# Patient Record
Sex: MALE | Race: WHITE | NOT HISPANIC OR LATINO | Employment: FULL TIME | URBAN - METROPOLITAN AREA
[De-identification: names, ages, dates, MRNs, and addresses within clinical notes are randomized per-mention and may not be internally consistent; named-entity substitution may affect disease eponyms.]

---

## 2017-08-02 ENCOUNTER — OFFICE VISIT (OUTPATIENT)
Dept: PSYCHOLOGY | Facility: CLINIC | Age: 37
End: 2017-08-02
Payer: COMMERCIAL

## 2017-08-02 DIAGNOSIS — F43.22 ADJUSTMENT DISORDER WITH ANXIOUS MOOD: Primary | ICD-10-CM

## 2017-08-02 PROCEDURE — 90791 PSYCH DIAGNOSTIC EVALUATION: CPT | Performed by: PSYCHOLOGIST

## 2017-08-02 NOTE — MR AVS SNAPSHOT
"                  MRN:[id number]                      After Visit Summary   2017    Dat Yoder    MRN: [id number]           Visit Information        Provider Department      2017 10:30 AM Alison Cheng JCARLOSCELINA [facility] Generic      Your next 10 appointments already scheduled     Aug 09, 2017  9:30 AM CDT   Return Visit with Alison GARBER RyanalcidesCELINA   [facility] ([address] [address] [phone]            Aug 16, 2017  9:30 AM CDT   Return Visit with Alison Cheng CELINA   [facility] ([address] [address] [phone]              MyChart Information     [employer] lets you send messages to your doctor, view your test results, renew your prescriptions, schedule appointments and more. To sign up, go to [email or web address] . Click on \"Log in\" on the left side of the screen, which will take you to the Welcome page. Then click on \"Sign up Now\" on the right side of the page.     You will be asked to enter the access code listed below, as well as some personal information. Please follow the directions to create your username and password.     Your access code is: [email or web address]  Expires: 10/31/2017 12:19 PM     Your access code will  in 90 days. If you need help or a new code, please call your [place] clinic or [phone].        Care EveryWhere ID     This is your Care EveryWhere ID. This could be used by other organizations to access your [place] medical records  [id number]        Equal Access to Services     SHIVA KUMAR : Hadii jo ann Romero, waaxda luqadaha, qaybta kaalmada pietro, curtis fregoso. So [facility] [phone].    ATENCIÓN: Si habla español, tiene a ramirez disposición servicios gratuitos de asistencia lingüística. Llame al [phone].    We comply with applicable " federal civil rights laws and [place] laws. We do not discriminate on the basis of race, color, national origin, age, disability sex, sexual orientation or gender identity.
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
## 2017-08-09 ENCOUNTER — OFFICE VISIT (OUTPATIENT)
Dept: PSYCHOLOGY | Facility: CLINIC | Age: 37
End: 2017-08-09
Payer: COMMERCIAL

## 2017-08-09 DIAGNOSIS — F43.22 ADJUSTMENT DISORDER WITH ANXIETY: Primary | ICD-10-CM

## 2017-08-09 DIAGNOSIS — Z63.0 PARTNER RELATIONSHIP PROBLEM: ICD-10-CM

## 2017-08-09 PROCEDURE — 90847 FAMILY PSYTX W/PT 50 MIN: CPT | Performed by: PSYCHOLOGIST

## 2017-08-09 SDOH — SOCIAL STABILITY - SOCIAL INSECURITY: PROBLEMS IN RELATIONSHIP WITH SPOUSE OR PARTNER: Z63.0

## 2017-08-09 NOTE — PROGRESS NOTES
Progress Note    Client Name: Dat Yoder  Date: 8/9/2017       Service Type: Couple      Session Start Time: 9:30  Session End Time: 10:25      Session Length: 55 min     Session #: 2     Attendees: Client and Spouse / Significant Other    Treatment Plan Last Reviewed: started  PHQ-9 / DANIELLE-7: last session     DATA      Progress Since Last Session (Related to Symptoms / Goals / Homework):   Symptoms: Improved    Homework: Partially completed      Episode of Care Goals: Minimal progress - ACTION (Actively working towards change); Intervened by reinforcing change plan / affirming steps taken     Current / Ongoing Stressors and Concerns:   Marital discord   Work stress     Treatment Objective(s) Addressed in This Session:   identify 2 strategies to more effectively address stressors  identify at least 2 techniques for intervening on the escalation  learn & utilize at least 1 patient and assertive communication skills weekly     Intervention:   CBT: ask not interpret  Interpersonal Therapy: listening and repairs. taking turns        ASSESSMENT: Current Emotional / Mental Status (status of significant symptoms):   Risk status (Self / Other harm or suicidal ideation)  Client denies a history of suicidal ideation, suicide attempts, self-injurious behavior, homicidal ideation, homicidal behavior and and other safety concerns   Client denies current fears or concerns for personal safety.   Client denies current or recent suicidal ideation or behaviors.   Client denies current or recent homicidal ideation or behaviors.   Client denies current or recent self injurious behavior or ideation.   Client denies other safety concerns.   A safety and risk management plan has not been developed at this time, however client was given the after-hours number / 911 should there be a change in any of these risk factors.     Appearance:   Appropriate    Eye Contact:   Good    Psychomotor  Behavior: Normal    Attitude:   Cooperative  Guarded    Orientation:   All   Speech    Rate / Production: Normal     Volume:  Normal    Mood:    Angry  Normal agitated    Affect:    Appropriate    Thought Content:  Clear    Thought Form:  Coherent  Logical    Insight:    Fair      Medication Review:   No current psychiatric medications prescribed     Medication Compliance:   NA     Changes in Health Issues:   None reported     Chemical Use Review:   Substance Use: Chemical use reviewed, no active concerns identified      Tobacco Use: No current tobacco use.       Collateral Reports Completed:   Not Applicable    PLAN: (Client Tasks / Therapist Tasks / Other)  Take turns listening. Responding and repairs. Look for effort. Build physical connection.        Alison Cheng LP                                                         ________________________________________________________________________    Treatment Plan    Client's Name: Dat Yoder  YOB: 1980    Date: 8/9/2017    DSM-V Diagnoses:  Adjustment Disorders  309.24 (F43.22) With anxiety  Psychosocial & Contextual Factors: marital discord, work stress  WHODAS: 13 at intake    Referral / Collaboration:  Referral to another professional/service is not indicated at this time..    Anticipated number of session or this episode of care: 10      MeasurableTreatment Goal(s) related to diagnosis / functional impairment(s)  Goal 1: Client will manage work stress and marital relationship better    Objective #A (Client Action)    Client will track and record at least weekly pleasant exchanges with wife.  Status: New - Date: 8/9/17     Intervention(s)  Therapist will assign homework building ways to connect  role-play effective communication skills.    Client has reviewed and agreed to the above plan.    Alison Cheng LP  August 9, 2017

## 2017-08-09 NOTE — MR AVS SNAPSHOT
"                  MRN:[id number]                      After Visit Summary   2017    Dat Yoder    MRN: [id number]           Visit Information        Provider Department      2017 9:30 AM Alison Cheng LP [facility] Generic      Your next 10 appointments already scheduled     Aug 16, 2017  9:30 AM CDT   Return Visit with Alison T CELINA Cheng   [facility] ([facility])    [address] [place] [address] [phone]              MyChart Information     [employer] lets you send messages to your doctor, view your test results, renew your prescriptions, schedule appointments and more. To sign up, go to [email or web address] . Click on \"Log in\" on the left side of the screen, which will take you to the Welcome page. Then click on \"Sign up Now\" on the right side of the page.     You will be asked to enter the access code listed below, as well as some personal information. Please follow the directions to create your username and password.     Your access code is: [email or web address]  Expires: 10/31/2017 12:19 PM     Your access code will  in 90 days. If you need help or a new code, please call your [place] clinic or [phone].        Care EveryWhere ID     This is your Care EveryWhere ID. This could be used by other organizations to access your [place] medical records  [id number]        Equal Access to Services     MICHELLE KUMAR AH: Hadii jo ann kimbrougho Soshantaali, waaxda luqadaha, qaybta kaalmada adeegyada, curtis shepard . So [facility] [phone].    ATENCIÓN: Si habla español, tiene a ramirez disposición servicios gratuitos de asistencia lingüística. Llame al [phone].    We comply with applicable federal civil rights laws and [place] laws. We do not discriminate on the basis of race, color, national origin, age, disability sex, sexual orientation or gender identity.            "

## 2018-01-24 ENCOUNTER — OFFICE VISIT (OUTPATIENT)
Dept: FAMILY MEDICINE | Facility: CLINIC | Age: 38
End: 2018-01-24
Payer: COMMERCIAL

## 2018-01-24 VITALS
RESPIRATION RATE: 16 BRPM | HEART RATE: 88 BPM | OXYGEN SATURATION: 96 % | TEMPERATURE: 102.1 F | WEIGHT: 285 LBS | DIASTOLIC BLOOD PRESSURE: 78 MMHG | BODY MASS INDEX: 39.75 KG/M2 | SYSTOLIC BLOOD PRESSURE: 138 MMHG

## 2018-01-24 DIAGNOSIS — J10.1 INFLUENZA A: Primary | ICD-10-CM

## 2018-01-24 LAB
FLUAV+FLUBV AG SPEC QL: NEGATIVE
FLUAV+FLUBV AG SPEC QL: POSITIVE
SPECIMEN SOURCE: ABNORMAL

## 2018-01-24 PROCEDURE — 87804 INFLUENZA ASSAY W/OPTIC: CPT | Performed by: NURSE PRACTITIONER

## 2018-01-24 PROCEDURE — 99213 OFFICE O/P EST LOW 20 MIN: CPT | Performed by: NURSE PRACTITIONER

## 2018-01-24 RX ORDER — OSELTAMIVIR PHOSPHATE 75 MG/1
75 CAPSULE ORAL 2 TIMES DAILY
Qty: 10 CAPSULE | Refills: 0 | Status: SHIPPED | OUTPATIENT
Start: 2018-01-24 | End: 2018-01-29

## 2018-01-24 ASSESSMENT — PAIN SCALES - GENERAL: PAINLEVEL: NO PAIN (0)

## 2018-01-24 NOTE — MR AVS SNAPSHOT
After Visit Summary   1/24/2018    Dat Yoder    MRN: [id number]           Patient Information     Date Of Birth          1980        Visit Information        Provider Department      1/24/2018 11:00 AM Vanessa Santillan APRN [facility]        Today's Diagnoses     Influenza A    -  1      Care Instructions    Positive for influenza A  Start Tamiflu today (antiviral)  Alternate tylenol and ibuprofen  Push fluids, rest  Contagious as long as you have symptoms + fever  Follow up with primary care provider in 3-5 days as needed, sooner if needed      Influenza (Adult)    Influenza is also called the flu. It is a viral illness that affects the air passages of your lungs. It is different from the common cold. The flu can easily be passed from one to person to another. It may be spread through the air by coughing and sneezing. Or it can be spread by touching the sick person and then touching your own eyes, nose, or mouth.  The flu starts 1 to 3 days after you are exposed to the flu virus. It may last for 1 to 2 weeks but many people feel tired or fatigued for many weeks afterward. You usually don t need to take antibiotics unless you have a complication. This might be an ear or sinus infection or pneumonia.  Symptoms of the flu may be mild or severe. They can include extreme tiredness (wanting to stay in bed all day), chills, fevers, muscle aches, soreness with eye movement, headache, and a dry, hacking cough.  Home care  Follow these guidelines when caring for yourself at home:    Avoid being around cigarette smoke, whether yours or other people s.    Acetaminophen or ibuprofen will help ease your fever, muscle aches, and headache. Don t give aspirin to anyone younger than 18 who has the flu. Aspirin can harm the liver.    Nausea and loss of appetite are common with the flu. Eat light meals. Drink 6 to 8 glasses of liquids every day. Good choices are water, sport drinks,  soft drinks without caffeine, juices, tea, and soup. Extra fluids will also help loosen secretions in your nose and lungs.    Over-the-counter cold medicines will not make the flu go away faster. But the medicines may help with coughing, sore throat, and congestion in your nose and sinuses. Don t use a decongestant if you have high blood pressure.    Stay home until your fever has been gone for at least 24 hours without using medicine to reduce fever.  Follow-up care  Follow up with your healthcare provider, or as advised, if you are not getting better over the next week.  If you are age 65 or older, talk with your provider about getting a pneumococcal vaccine every 5 years. You should also get this vaccine if you have chronic asthma or COPD. All adults should get a flu vaccine every fall. Ask your provider about this.  When to seek medical advice  Call your healthcare provider right away if any of these occur:    Cough with lots of colored mucus (sputum) or blood in your mucus    Chest pain, shortness of breath, wheezing, or trouble breathing    Severe headache, or face, neck, or ear pain    New rash with fever    Fever of 100.4 F (38 C) or higher, or as directed by your healthcare provider    Confusion, behavior change, or seizure    Severe weakness or dizziness    You get a new fever or cough after getting better for a few days  Date Last Reviewed: 1/1/2017 2000-2017 [employer]. [address] [place] [address]. All rights reserved. This information is not intended as a substitute for professional medical care. Always follow your healthcare professional's instructions.                Follow-ups after your visit        Your next 10 appointments already scheduled     Jan 24, 2018 11:00 AM CST   Office Visit with AMANDA Thomas [facility] ([facility])    [address] [facility] [id number] [phone]           Bring  "a current list of meds and any records pertaining to this visit. For Physicals, please bring immunization records and any forms needing to be filled out. Please arrive 10 minutes early to complete paperwork.              Who to contact     If you have questions or need follow up information about today's clinic visit or your schedule please contact [facility] CASIMIRO HUTTON directly at [phone].  Normal or non-critical lab and imaging results will be communicated to you by MyChart, letter or phone within 4 business days after the clinic has received the results. If you do not hear from us within 7 days, please contact the clinic through [employer]hart or phone. If you have a critical or abnormal lab result, we will notify you by phone as soon as possible.  Submit refill requests through [employer] or call your pharmacy and they will forward the refill request to us. Please allow 3 business days for your refill to be completed.          Additional Information About Your Visit        [employer] ([facility] [employer] Information     [employer] lets you send messages to your doctor, view your test results, renew your prescriptions, schedule appointments and more. To sign up, go to [email or web address] . Click on \"Log in\" on the left side of the screen, which will take you to the Welcome page. Then click on \"Sign up Now\" on the right side of the page.     You will be asked to enter the access code listed below, as well as some personal information. Please follow the directions to create your username and password.     Your access code is: [email or web address]  Expires: 2018 10:55 AM     Your access code will  in 90 days. If you need help or a new code, please call your [place] clinic or [phone].        Care EveryWhere ID     This is your Care EveryWhere ID. This could be used by other organizations to access your [place] medical records  [id number]        Your Vitals Were     Pulse Temperature Respirations Pulse Oximetry BMI (Body " Mass Index)       88 102.1  F (38.9  C) (Oral) 16 96% 39.75 kg/m2        Blood Pressure from Last 3 Encounters:   01/24/18 138/78   04/26/16 134/82   11/18/14 108/70    Weight from Last 3 Encounters:   01/24/18 285 lb (129.3 kg)   04/26/16 289 lb (131.1 kg)   11/18/14 281 lb (127.5 kg)              We Performed the Following     Influenza A/B antigen          Today's Medication Changes          These changes are accurate as of 1/24/18 10:55 AM.  If you have any questions, ask your nurse or doctor.               Start taking these medicines.        Dose/Directions    oseltamivir 75 MG capsule   Commonly known as:  TAMIFLU   Used for:  Influenza A   Started by:  Vanessa Santillan APRN CNP        Dose:  75 mg   Take 1 capsule (75 mg) by mouth 2 times daily for 5 days   Quantity:  10 capsule   Refills:  0            Where to get your medicines      Some of these will need a paper prescription and others can be bought over the counter.  Ask your nurse if you have questions.     Bring a paper prescription for each of these medications     oseltamivir 75 MG capsule                Primary Care Provider Office Phone # Fax #    Orestes Kane Barrera [phone] [address] [place], [place] [id number]        Equal Access to Services     MICHELLE KUMAR AH: Hadii jo ann kimbrougho Soroge, waaxda luqadaha, qaybta kaalmada adeegyada, curtis fregoso. So [facility] [phone].    ATENCIÓN: Si habla español, tiene a ramirez disposición servicios gratuitos de asistencia lingüística. Ll[facility] [phone].    We comply with applicable federal civil rights laws and [place] laws. We do not discriminate on the basis of race, color, national origin, age, disability, sex, sexual orientation, or gender identity.            Thank you!     Thank you for choosing [facility]  for your care. Our goal is always to provide you with excellent care. Hearing back from our patients is one way  we can continue to improve our services. Please take a few minutes to complete the written survey that you may receive in the mail after your visit with us. Thank you!             Your Updated Medication List - Protect others around you: Learn how to safely use, store and throw away your medicines at www.disposemymeds.org.          This list is accurate as of 1/24/18 10:55 AM.  Always use your most recent med list.                   Brand Name Dispense Instructions for use Diagnosis    NO ACTIVE MEDICATIONS      .        oseltamivir 75 MG capsule    TAMIFLU    10 capsule    Take 1 capsule (75 mg) by mouth 2 times daily for 5 days    Influenza A

## 2018-01-24 NOTE — PATIENT INSTRUCTIONS
Positive for influenza A  Start Tamiflu today (antiviral)  Alternate tylenol and ibuprofen  Push fluids, rest  Contagious as long as you have symptoms + fever  Follow up with primary care provider in 3-5 days as needed, sooner if needed      Influenza (Adult)    Influenza is also called the flu. It is a viral illness that affects the air passages of your lungs. It is different from the common cold. The flu can easily be passed from one to person to another. It may be spread through the air by coughing and sneezing. Or it can be spread by touching the sick person and then touching your own eyes, nose, or mouth.  The flu starts 1 to 3 days after you are exposed to the flu virus. It may last for 1 to 2 weeks but many people feel tired or fatigued for many weeks afterward. You usually don t need to take antibiotics unless you have a complication. This might be an ear or sinus infection or pneumonia.  Symptoms of the flu may be mild or severe. They can include extreme tiredness (wanting to stay in bed all day), chills, fevers, muscle aches, soreness with eye movement, headache, and a dry, hacking cough.  Home care  Follow these guidelines when caring for yourself at home:    Avoid being around cigarette smoke, whether yours or other people s.    Acetaminophen or ibuprofen will help ease your fever, muscle aches, and headache. Don t give aspirin to anyone younger than 18 who has the flu. Aspirin can harm the liver.    Nausea and loss of appetite are common with the flu. Eat light meals. Drink 6 to 8 glasses of liquids every day. Good choices are water, sport drinks, soft drinks without caffeine, juices, tea, and soup. Extra fluids will also help loosen secretions in your nose and lungs.    Over-the-counter cold medicines will not make the flu go away faster. But the medicines may help with coughing, sore throat, and congestion in your nose and sinuses. Don t use a decongestant if you have high blood pressure.    Stay home  until your fever has been gone for at least 24 hours without using medicine to reduce fever.  Follow-up care  Follow up with your healthcare provider, or as advised, if you are not getting better over the next week.  If you are age 65 or older, talk with your provider about getting a pneumococcal vaccine every 5 years. You should also get this vaccine if you have chronic asthma or COPD. All adults should get a flu vaccine every fall. Ask your provider about this.  When to seek medical advice  Call your healthcare provider right away if any of these occur:    Cough with lots of colored mucus (sputum) or blood in your mucus    Chest pain, shortness of breath, wheezing, or trouble breathing    Severe headache, or face, neck, or ear pain    New rash with fever    Fever of 100.4 F (38 C) or higher, or as directed by your healthcare provider    Confusion, behavior change, or seizure    Severe weakness or dizziness    You get a new fever or cough after getting better for a few days  Date Last Reviewed: 1/1/2017 2000-2017 [employer]. [address] [place] [address], [place] [address]. All rights reserved. This information is not intended as a substitute for professional medical care. Always follow your healthcare professional's instructions.

## 2018-01-24 NOTE — PROGRESS NOTES
"  SUBJECTIVE:   Dat Yoder is a 37 year old male who presents to clinic today for the following health issues:    Acute Illness   Acute illness concerns: Cough  Onset: 4 days     Fever: YES    Chills/Sweats: YES    Headache (location?): YES    Sinus Pressure:YES    Conjunctivitis:  no    Ear Pain: YES: both    Rhinorrhea: YES    Congestion: No    Sore Throat: no      Cough: YES-productive of green/yellow sputum    Wheeze: YES    Decreased Appetite: YES    Nausea: no     Vomiting: no     Diarrhea:  YES    Dysuria/Freq.: no     Fatigue/Achiness: YES    Sick/Strep Exposure: YES     Therapies Tried and outcome: Vitamin C, tea, water, vitamins/herbs    Chest felt heavy 3 days ago. Cough started next day. Now cough is \"on fire.\" Shivering and fever started today. Cough is both dry and productive. No chest pain. Feels a little short of breath with cough. No difficulty breathing. States \"but I worked out just fine.\" Took sudafed. No infleunza vaccine. Family sick with similar symptoms last week. Took tylenol and echinacea, vit C, and pre-workout and a few other workout supplements at 4:45. Did elliptical x40 min. After workout this morning cold symptoms worse.      Problem list and histories reviewed & adjusted, as indicated.  Additional history: as documented    Patient Active Problem List   Diagnosis     Obesity     Nicotine dependence in remission     CARDIOVASCULAR SCREENING; LDL GOAL LESS THAN 160     Past Surgical History:   Procedure Laterality Date     SURGICAL HISTORY OF -   2003    correction of priapism x 2       Social History   Substance Use Topics     Smoking status: Former Smoker     Quit date: 1/1/2012     Smokeless tobacco: Never Used      Comment: Social      Alcohol use Yes      Comment: Socially - Weekends      Family History   Problem Relation Age of Onset     Breast Cancer Mother 50     Lipids Father      Obesity Mother      Obesity Father      Thyroid Disease Mother      GASTROINTESTINAL DISEASE " Sister      Respiratory Maternal Grandmother      CANCER Maternal Grandfather      Brain Cancer     CANCER Paternal Grandfather      Brain Cancer         Current Outpatient Prescriptions   Medication Sig Dispense Refill     oseltamivir (TAMIFLU) 75 MG capsule Take 1 capsule (75 mg) by mouth 2 times daily for 5 days 10 capsule 0     NO ACTIVE MEDICATIONS .       No Known Allergies    Reviewed and updated as needed this visit by clinical staff  Tobacco  Allergies  Meds  Problems       Reviewed and updated as needed this visit by Provider  Allergies  Meds  Problems         ROS:  Constitutional, HEENT, cardiovascular, pulmonary, gi and gu systems are negative, except as otherwise noted.    OBJECTIVE:     /78 (BP Location: Left arm, Patient Position: Chair, Cuff Size: Adult Large)  Pulse 88  Temp 102.1  F (38.9  C) (Oral)  Resp 16  Wt 285 lb (129.3 kg)  SpO2 96%  BMI 39.75 kg/m2  Body mass index is 39.75 kg/(m^2).  GENERAL: alert, no distress and appears tired  HENT: ear canals and TM's normal, nose and mouth without ulcers or lesions  NECK: no adenopathy, no asymmetry, masses, or scars and thyroid normal to palpation  RESP: lungs clear to auscultation - no rales, rhonchi or wheezes  CV: regular rate and rhythm, normal S1 S2, no S3 or S4, no murmur, click or rub, no peripheral edema and peripheral pulses strong  MS: no gross musculoskeletal defects noted, no edema  PSYCH: mentation appears normal, affect normal/bright    Diagnostic Test Results:  Results for orders placed or performed in visit on 01/24/18 (from the past 24 hour(s))   Influenza A/B antigen   Result Value Ref Range    Influenza A/B Agn Specimen Nasal     Influenza A Positive (A) NEG^Negative    Influenza B Negative NEG^Negative       ASSESSMENT/PLAN:         ICD-10-CM    1. Influenza A J10.1 Influenza A/B antigen     oseltamivir (TAMIFLU) 75 MG capsule     Start Tamiflu today (antiviral)  Alternate tylenol and ibuprofen  Push fluids,  rest  Follow up with primary care provider in 3-5 days as needed, sooner if needed    See Patient Instructions    AMANDA Cuellar [facility]

## 2019-01-29 ENCOUNTER — FCC EXTENDED DOCUMENTATION (OUTPATIENT)
Dept: PSYCHOLOGY | Facility: CLINIC | Age: 39
End: 2019-01-29

## 2019-01-29 DIAGNOSIS — F43.22 ADJUSTMENT DISORDER WITH ANXIETY: Primary | ICD-10-CM

## 2019-01-29 NOTE — PROGRESS NOTES
Discharge Summary  Multiple Sessions    Client Name: Dat Yoder MRN#: [id number] YOB: 1980      Intake / Discharge Date: 8/2/17 - 8/9/17 - 2 couples [place]    DSM5 Diagnoses:   Diagnoses:Adjustment Disorders  309.24 (F43.22) With anxiety  Psychosocial & Contextual Factors: marital discord, work stress  WHODAS: 14 at intake          Presenting Concern:  Relationship problems    Reason for Discharge:  Client did not return      Disposition at Time of Last Encounter:   Comments:   Ok     Risk Management:   Client denies a history of suicidal ideation, suicide attempts, self-injurious behavior, homicidal ideation, homicidal behavior and and other safety concerns  A safety and risk management plan has not been developed at this time, however client was given the after-hours number / 911 should there be a change in any of these risk factors.      Referred To:  Take turns listening. Responding and repairs. Look for effort. Build physical connection. Return to therapy as needed.    Alison Cheng, CELINA   1/29/2019

## 2019-12-09 ENCOUNTER — OFFICE VISIT (OUTPATIENT)
Dept: PEDIATRICS | Facility: CLINIC | Age: 39
End: 2019-12-09
Payer: COMMERCIAL

## 2019-12-09 VITALS
SYSTOLIC BLOOD PRESSURE: 114 MMHG | TEMPERATURE: 98.4 F | HEIGHT: 73 IN | OXYGEN SATURATION: 99 % | HEART RATE: 56 BPM | WEIGHT: 250.3 LBS | DIASTOLIC BLOOD PRESSURE: 62 MMHG | BODY MASS INDEX: 33.17 KG/M2

## 2019-12-09 DIAGNOSIS — Z13.220 SCREENING FOR LIPID DISORDERS: ICD-10-CM

## 2019-12-09 DIAGNOSIS — Z00.00 ROUTINE GENERAL MEDICAL EXAMINATION AT A HEALTH CARE FACILITY: Primary | ICD-10-CM

## 2019-12-09 DIAGNOSIS — E66.811 CLASS 1 OBESITY DUE TO EXCESS CALORIES WITHOUT SERIOUS COMORBIDITY WITH BODY MASS INDEX (BMI) OF 32.0 TO 32.9 IN ADULT: ICD-10-CM

## 2019-12-09 DIAGNOSIS — R63.4 RAPID WEIGHT LOSS: ICD-10-CM

## 2019-12-09 DIAGNOSIS — E66.09 CLASS 1 OBESITY DUE TO EXCESS CALORIES WITHOUT SERIOUS COMORBIDITY WITH BODY MASS INDEX (BMI) OF 32.0 TO 32.9 IN ADULT: ICD-10-CM

## 2019-12-09 LAB
ALBUMIN SERPL-MCNC: 4.6 G/DL (ref 3.4–5)
ALP SERPL-CCNC: 63 U/L (ref 40–150)
ALT SERPL W P-5'-P-CCNC: 56 U/L (ref 0–70)
ANION GAP SERPL CALCULATED.3IONS-SCNC: 3 MMOL/L (ref 3–14)
AST SERPL W P-5'-P-CCNC: 26 U/L (ref 0–45)
BILIRUB SERPL-MCNC: 1.2 MG/DL (ref 0.2–1.3)
BUN SERPL-MCNC: 18 MG/DL (ref 7–30)
CALCIUM SERPL-MCNC: 9.4 MG/DL (ref 8.5–10.1)
CHLORIDE SERPL-SCNC: 105 MMOL/L (ref 94–109)
CHOLEST SERPL-MCNC: 167 MG/DL
CO2 SERPL-SCNC: 30 MMOL/L (ref 20–32)
CREAT SERPL-MCNC: 0.84 MG/DL (ref 0.66–1.25)
ERYTHROCYTE [DISTWIDTH] IN BLOOD BY AUTOMATED COUNT: 12.4 % (ref 10–15)
GFR SERPL CREATININE-BSD FRML MDRD: >90 ML/MIN/{1.73_M2}
GLUCOSE SERPL-MCNC: 107 MG/DL (ref 70–99)
HCT VFR BLD AUTO: 45.9 % (ref 40–53)
HDLC SERPL-MCNC: 108 MG/DL
HGB BLD-MCNC: 16 G/DL (ref 13.3–17.7)
LDLC SERPL CALC-MCNC: 51 MG/DL
MCH RBC QN AUTO: 29.7 PG (ref 26.5–33)
MCHC RBC AUTO-ENTMCNC: 34.9 G/DL (ref 31.5–36.5)
MCV RBC AUTO: 85 FL (ref 78–100)
NONHDLC SERPL-MCNC: 59 MG/DL
PLATELET # BLD AUTO: 188 10E9/L (ref 150–450)
POTASSIUM SERPL-SCNC: 3.7 MMOL/L (ref 3.4–5.3)
PROT SERPL-MCNC: 7.5 G/DL (ref 6.8–8.8)
RBC # BLD AUTO: 5.39 10E12/L (ref 4.4–5.9)
SODIUM SERPL-SCNC: 138 MMOL/L (ref 133–144)
TRIGL SERPL-MCNC: 41 MG/DL
TSH SERPL DL<=0.005 MIU/L-ACNC: 3.01 MU/L (ref 0.4–4)
WBC # BLD AUTO: 6.5 10E9/L (ref 4–11)

## 2019-12-09 PROCEDURE — 85027 COMPLETE CBC AUTOMATED: CPT | Performed by: INTERNAL MEDICINE

## 2019-12-09 PROCEDURE — 80053 COMPREHEN METABOLIC PANEL: CPT | Performed by: INTERNAL MEDICINE

## 2019-12-09 PROCEDURE — 90686 IIV4 VACC NO PRSV 0.5 ML IM: CPT | Performed by: INTERNAL MEDICINE

## 2019-12-09 PROCEDURE — 84443 ASSAY THYROID STIM HORMONE: CPT | Performed by: INTERNAL MEDICINE

## 2019-12-09 PROCEDURE — 36415 COLL VENOUS BLD VENIPUNCTURE: CPT | Performed by: INTERNAL MEDICINE

## 2019-12-09 PROCEDURE — 90471 IMMUNIZATION ADMIN: CPT | Performed by: INTERNAL MEDICINE

## 2019-12-09 PROCEDURE — 80061 LIPID PANEL: CPT | Performed by: INTERNAL MEDICINE

## 2019-12-09 PROCEDURE — 99385 PREV VISIT NEW AGE 18-39: CPT | Mod: 25 | Performed by: INTERNAL MEDICINE

## 2019-12-09 ASSESSMENT — MIFFLIN-ST. JEOR: SCORE: 2112.84

## 2019-12-09 NOTE — LETTER
December 9, 2019      Dat Yoder  [address] [facility] [address]        Dear ,    We are writing to inform you of your test results.    Kidney function, liver function and electrolytes are normal.  Your lab shows normal white count and hemoglobin.  Your cholesterol profile is within normal range including the good cholesterol, HDL and the bad cholesterol LDL.  Thyroid test is normal.  Fasting blood sugar is borderline elevated 107.  A value below 100 is normal.  This is not of concern at this time.    Resulted Orders   CBC with platelets   Result Value Ref Range    WBC 6.5 4.0 - 11.0 10e9/L    RBC Count 5.39 4.4 - 5.9 10e12/L    Hemoglobin 16.0 13.3 - 17.7 g/dL    Hematocrit 45.9 40.0 - 53.0 %    MCV 85 78 - 100 fl    MCH 29.7 26.5 - 33.0 pg    MCHC 34.9 31.5 - 36.5 g/dL    RDW 12.4 10.0 - 15.0 %    Platelet Count 188 150 - 450 10e9/L   Comprehensive metabolic panel   Result Value Ref Range    Sodium 138 133 - 144 mmol/L    Potassium 3.7 3.4 - 5.3 mmol/L    Chloride 105 94 - 109 mmol/L    Carbon Dioxide 30 20 - 32 mmol/L    Anion Gap 3 3 - 14 mmol/L    Glucose 107 (H) 70 - 99 mg/dL    Urea Nitrogen 18 7 - 30 mg/dL    Creatinine 0.84 0.66 - 1.25 mg/dL    GFR Estimate >90 >60 mL/min/[1.73_m2]      Comment:      Non  GFR Calc  Starting 12/18/2018, serum creatinine based estimated GFR (eGFR) will be   calculated using the Chronic Kidney Disease Epidemiology Collaboration   (CKD-EPI) equation.      GFR Estimate If Black >90 >60 mL/min/[1.73_m2]      Comment:       GFR Calc  Starting 12/18/2018, serum creatinine based estimated GFR (eGFR) will be   calculated using the Chronic Kidney Disease Epidemiology Collaboration   (CKD-EPI) equation.      Calcium 9.4 8.5 - 10.1 mg/dL    Bilirubin Total 1.2 0.2 - 1.3 mg/dL    Albumin 4.6 3.4 - 5.0 g/dL    Protein Total 7.5 6.8 - 8.8 g/dL    Alkaline Phosphatase 63 40 - 150 U/L    ALT 56 0 - 70 U/L    AST 26 0 - 45 U/L   Lipid  Profile   Result Value Ref Range    Cholesterol 167 <200 mg/dL    Triglycerides 41 <150 mg/dL    HDL Cholesterol 108 >39 mg/dL    LDL Cholesterol Calculated 51 <100 mg/dL      Comment:      Desirable:       <100 mg/dl    Non HDL Cholesterol 59 <130 mg/dL   TSH   Result Value Ref Range    TSH 3.01 0.40 - 4.00 mU/L       If you have any questions or concerns, please call the clinic at the number listed above.   Recommend you return for recheck in 1 year.      Sincerely,        Nelson Gentile MD

## 2019-12-09 NOTE — PROGRESS NOTES
3  SUBJECTIVE:   CC: Dat Yoder is an 39 year old male who presents for preventive health visit.     39-year-old gentleman comes for a physical examination.  2018 he went on a keto diet.  He has had a rapid weight loss of close to 40 pounds.  He exercises 6 days a week.  Usually runs anywhere from 4 to 6 months.  He denies shortness of breath, chest pain, change in bowel habits, dizziness or lightheadedness.  He does not take any regular medication.  Does not use tobacco and uses alcohol socially.      Patient is fasting for lab work today    Healthy Habits:    Do you get at least three servings of calcium containing foods daily (dairy, green leafy vegetables, etc.)? yes    Amount of exercise or daily activities, outside of work: 6 day(s) per week    Problems taking medications regularly not applicable    Medication side effects: No    Have you had an eye exam in the past two years? no    Do you see a dentist twice per year? yes    Do you have sleep apnea, excessive snoring or daytime drowsiness?yes, snoring and family history of sleep apnea    Today's PHQ-2 Score:   PHQ-2 (  [employer]) 2019   Q1: Little interest or pleasure in doing things 0 0   Q2: Feeling down, depressed or hopeless 0 0   PHQ-2 Score 0 0       Abuse: Current or Past(Physical, Sexual or Emotional)- No  Do you feel safe in your environment? Yes        Social History     Tobacco Use     Smoking status: Former Smoker     Last attempt to quit: 2012     Years since quittin.9     Smokeless tobacco: Never Used     Tobacco comment: Social    Substance Use Topics     Alcohol use: Yes     Alcohol/week: 5.0 standard drinks     Types: 5 Cans of beer per week     Comment: Socially - Weekends      If you drink alcohol do you typically have >3 drinks per day or >7 drinks per week? No                      Last PSA: No results found for: PSA    Reviewed orders with patient. Reviewed health maintenance and updated orders  accordingly - Yes  BP Readings from Last 3 Encounters:   19 114/62   18 138/78   16 134/82    Wt Readings from Last 3 Encounters:   19 113.5 kg (250 lb 4.8 oz)   18 129.3 kg (285 lb)   16 131.1 kg (289 lb)                  Patient Active Problem List   Diagnosis     Class 1 obesity due to excess calories with body mass index (BMI) of 32.0 to 32.9 in adult     CARDIOVASCULAR SCREENING; LDL GOAL LESS THAN 160     Past Surgical History:   Procedure Laterality Date     SURGICAL HISTORY OF -       correction of priapism x 2       Social History     Tobacco Use     Smoking status: Former Smoker     Last attempt to quit: 2012     Years since quittin.9     Smokeless tobacco: Never Used     Tobacco comment: Social    Substance Use Topics     Alcohol use: Yes     Alcohol/week: 5.0 standard drinks     Types: 5 Cans of beer per week     Comment: Socially - Weekends      Family History   Problem Relation Age of Onset     Breast Cancer Mother 50     Obesity Mother      Thyroid Disease Mother      Lipids Father      Obesity Father      Crohn's Disease Sister      Respiratory Maternal Grandmother      Cancer Maternal Grandfather         Brain Cancer     Cancer Paternal Grandfather         Brain Cancer         Current Outpatient Medications   Medication Sig Dispense Refill     Multiple Vitamins-Minerals (MULTIVITAMIN ADULT PO)        No Known Allergies    Reviewed and updated as needed this visit by clinical staff  Tobacco  Allergies  Meds  Med Hx  Surg Hx  Fam Hx  Soc Hx        Reviewed and updated as needed this visit by Provider  Fam Hx        Past Medical History:   Diagnosis Date     Priapism     Resolved     Tobacco use disorder     Social smoker. Started smoking at 19 years and quite in       Past Surgical History:   Procedure Laterality Date     SURGICAL HISTORY OF -       correction of priapism x 2       ROS:  CONSTITUTIONAL: NEGATIVE for fever, chills,  "change in weight  INTEGUMENTARY/SKIN: NEGATIVE for worrisome rashes, moles or lesions  EYES: NEGATIVE for vision changes or irritation  ENT: NEGATIVE for ear, mouth and throat problems  RESP: NEGATIVE for significant cough or SOB  CV: NEGATIVE for chest pain, palpitations or peripheral edema  GI: NEGATIVE for nausea, abdominal pain, heartburn, or change in bowel habits   male: negative for dysuria, hematuria, decreased urinary stream, erectile dysfunction, urethral discharge  MUSCULOSKELETAL: NEGATIVE for significant arthralgias or myalgia  NEURO: NEGATIVE for weakness, dizziness or paresthesias  ENDOCRINE: NEGATIVE for temperature intolerance, skin/hair changes  HEME/ALLERGY/IMMUNE: NEGATIVE for bleeding problems  PSYCHIATRIC: NEGATIVE for changes in mood or affect    OBJECTIVE:   /62 (BP Location: Right arm, Patient Position: Sitting, Cuff Size: Adult Large)   Pulse 56   Temp 98.4  F (36.9  C) (Temporal)   Ht 1.86 m (6' 1.23\")   Wt 113.5 kg (250 lb 4.8 oz)   SpO2 99%   BMI 32.82 kg/m    EXAM:  GENERAL: healthy, alert and no distress  EYES: Eyes grossly normal to inspection, PERRL and conjunctivae and sclerae normal  HENT: ear canals and TM's normal, nose and mouth without ulcers or lesions  NECK: no adenopathy, no asymmetry, masses, or scars and thyroid normal to palpation  RESP: lungs clear to auscultation - no rales, rhonchi or wheezes  CV: regular rate and rhythm, normal S1 S2, no S3 or S4, no murmur, click or rub, no peripheral edema and peripheral pulses strong  ABDOMEN: soft, nontender, no hepatosplenomegaly, no masses and bowel sounds normal   (male): normal male genitalia without lesions or urethral discharge, no hernia  RECTAL: normal sphincter tone, no rectal masses, prostate normal size, smooth, nontender without nodules or masses  MS: no gross musculoskeletal defects noted, no edema  SKIN: no suspicious lesions or rashes  NEURO: Normal strength and tone, mentation intact and speech " "normal  PSYCH: mentation appears normal, affect normal/bright  LYMPH: no cervical, supraclavicular, axillary, or inguinal adenopathy    Diagnostic Test Results:  Labs reviewed in Epic    ASSESSMENT/PLAN:     1.  Annual physical examination completed and is good.  2.  Rapid weight loss.  This is intentional weight loss.  Will check CBC, CMP, TSH.  3.  Class I obesity.  BMI is around 32.  Patient has lost almost 40 pounds in the past year and is regularly exercising.  4.  Screening for lipid disorder.    I will get back to her with the lab results and recommendation.  He is current with his immunization.      COUNSELING:  Reviewed preventive health counseling, as reflected in patient instructions       Regular exercise       Healthy diet/nutrition       Vision screening    Estimated body mass index is 32.82 kg/m  as calculated from the following:    Height as of this encounter: 1.86 m (6' 1.23\").    Weight as of this encounter: 113.5 kg (250 lb 4.8 oz).    Weight management plan: Discussed healthy diet and exercise guidelines     reports that he quit smoking about 7 years ago. He has never used smokeless tobacco.      Counseling Resources:  ATP IV Guidelines  Pooled Cohorts Equation Calculator  FRAX Risk Assessment  ICSI Preventive Guidelines  Dietary Guidelines for Americans, 2010  USDA's MyPlate  ASA Prophylaxis  Lung CA Screening    Nelson Gentile MD  [facility]  "

## 2020-10-22 ENCOUNTER — ALLIED HEALTH/NURSE VISIT (OUTPATIENT)
Dept: PEDIATRICS | Facility: CLINIC | Age: 40
End: 2020-10-22
Payer: COMMERCIAL

## 2020-10-22 DIAGNOSIS — Z23 NEED FOR PROPHYLACTIC VACCINATION AND INOCULATION AGAINST INFLUENZA: Primary | ICD-10-CM

## 2020-10-22 PROCEDURE — 90686 IIV4 VACC NO PRSV 0.5 ML IM: CPT

## 2020-10-22 PROCEDURE — 99207 PR NO CHARGE NURSE ONLY: CPT

## 2022-12-04 ENCOUNTER — HEALTH MAINTENANCE LETTER (OUTPATIENT)
Age: 42
End: 2022-12-04

## 2023-01-02 ENCOUNTER — OFFICE VISIT (OUTPATIENT)
Dept: FAMILY MEDICINE | Facility: CLINIC | Age: 43
End: 2023-01-02
Payer: COMMERCIAL

## 2023-01-02 DIAGNOSIS — F33.41 RECURRENT MAJOR DEPRESSIVE DISORDER, IN PARTIAL REMISSION (H): ICD-10-CM

## 2023-01-02 DIAGNOSIS — E66.01 MORBID OBESITY (H): ICD-10-CM

## 2023-01-02 DIAGNOSIS — Z11.4 SCREENING FOR HIV (HUMAN IMMUNODEFICIENCY VIRUS): ICD-10-CM

## 2023-01-02 DIAGNOSIS — Z87.898 HISTORY OF SNORING: ICD-10-CM

## 2023-01-02 DIAGNOSIS — Z11.59 NEED FOR HEPATITIS C SCREENING TEST: ICD-10-CM

## 2023-01-02 DIAGNOSIS — Z00.00 ROUTINE GENERAL MEDICAL EXAMINATION AT A HEALTH CARE FACILITY: Primary | ICD-10-CM

## 2023-01-02 DIAGNOSIS — R03.0 ELEVATED BP WITHOUT DIAGNOSIS OF HYPERTENSION: ICD-10-CM

## 2023-01-02 PROCEDURE — 99386 PREV VISIT NEW AGE 40-64: CPT | Performed by: FAMILY MEDICINE

## 2023-01-02 PROCEDURE — 99214 OFFICE O/P EST MOD 30 MIN: CPT | Mod: 25 | Performed by: FAMILY MEDICINE

## 2023-01-02 ASSESSMENT — ENCOUNTER SYMPTOMS
HEADACHES: 0
HEMATURIA: 0
FEVER: 0
DIZZINESS: 0
PALPITATIONS: 0
NAUSEA: 0
HEMATOCHEZIA: 0
COUGH: 0
CONSTIPATION: 0
SHORTNESS OF BREATH: 0
CHILLS: 0
JOINT SWELLING: 0
EYE PAIN: 0
ARTHRALGIAS: 0
HEARTBURN: 0
DIARRHEA: 0
NERVOUS/ANXIOUS: 0
MYALGIAS: 0
FREQUENCY: 0
PARESTHESIAS: 0
ABDOMINAL PAIN: 0
WEAKNESS: 0
SORE THROAT: 0
DYSURIA: 0

## 2023-01-02 ASSESSMENT — PAIN SCALES - GENERAL: PAINLEVEL: NO PAIN (0)

## 2023-01-02 NOTE — PROGRESS NOTES
SUBJECTIVE:   CC: Dat Yoder is an 42 year old who presents for preventative health visit.     Patient has been advised of split billing requirements and indicates understanding: Yes     Healthy Habits:     Getting at least 3 servings of Calcium per day:  Yes    Bi-annual eye exam:  NO    Dental care twice a year:  Yes    Sleep apnea or symptoms of sleep apnea:  Excessive snoring and Sleep apnea    Diet:  Regular (no restrictions)    Frequency of exercise:  4-5 days/week    Duration of exercise:  45-60 minutes    Taking medications regularly:  Yes    Medication side effects:  None    PHQ-2 Total Score: 2    Additional concerns today:  Yes  Keto - 290-240 lbs- 2019, stopped 1 yr  Gained weight, lack of proper diet added to lot of international travel made him gain weight.  Joined '[employer]' program for weight loss-committed for 6 months    Therapy once in a month for depression  Family h/o EDWARDO      Today's PHQ-2 Score:   PHQ-2 (  [employer]) 2023   Q1: Little interest or pleasure in doing things 1   Q2: Feeling down, depressed or hopeless 1   PHQ-2 Score 2   PHQ-2 Total Score (12-17 Years)- Positive if 3 or more points; Administer PHQ-A if positive -   Q1: Little interest or pleasure in doing things Several days   Q2: Feeling down, depressed or hopeless Several days   PHQ-2 Score 2       Have you ever done Advance Care Planning? (For example, a Health Directive, POLST, or a discussion with a medical provider or your loved ones about your wishes): No, advance care planning information given to patient to review.  Patient plans to discuss their wishes with loved ones or provider.      Social History     Tobacco Use     Smoking status: Former     Types: Cigarettes     Quit date: 2012     Years since quittin.0     Smokeless tobacco: Never     Tobacco comments:     Social    Substance Use Topics     Alcohol use: Yes     Alcohol/week: 5.0 standard drinks     Types: 5 Cans of beer per week     Comment: Socially  - Weekends      If you drink alcohol do you typically have >3 drinks per day or >7 drinks per week? Yes      Alcohol Use 1/2/2023   Prescreen: >3 drinks/day or >7 drinks/week? No   Prescreen: >3 drinks/day or >7 drinks/week? -   No flowsheet data found.    Last PSA: No results found for: PSA    Reviewed orders with patient. Reviewed health maintenance and updated orders accordingly - Yes  Labs reviewed in EPIC    Reviewed and updated as needed this visit by clinical staff                  Reviewed and updated as needed this visit by Provider                     Review of Systems   Constitutional: Negative for chills and fever.   HENT: Negative for congestion, ear pain, hearing loss and sore throat.    Eyes: Negative for pain and visual disturbance.   Respiratory: Negative for cough and shortness of breath.    Cardiovascular: Negative for chest pain, palpitations and peripheral edema.   Gastrointestinal: Negative for abdominal pain, constipation, diarrhea, heartburn, hematochezia and nausea.   Genitourinary: Negative for dysuria, frequency, genital sores, hematuria, impotence, penile discharge and urgency.   Musculoskeletal: Negative for arthralgias, joint swelling and myalgias.   Skin: Negative for rash.   Neurological: Negative for dizziness, weakness, headaches and paresthesias.   Psychiatric/Behavioral: Positive for mood changes. The patient is not nervous/anxious.          OBJECTIVE:   There were no vitals taken for this visit.    Physical Exam  GENERAL: healthy, alert and no distress  NECK: no adenopathy, no asymmetry, masses, or scars and thyroid normal to palpation  RESP: lungs clear to auscultation - no rales, rhonchi or wheezes  CV: regular rate and rhythm, normal S1 S2, no S3 or S4, no murmur, click or rub, no peripheral edema and peripheral pulses strong  ABDOMEN: soft, nontender, no hepatosplenomegaly, no masses and bowel sounds normal  MS: no gross musculoskeletal defects noted, no edema    Diagnostic  Test Results:  Labs reviewed in Epic    ASSESSMENT/PLAN:   (Z00.00) Routine general medical examination at a health care facility  (primary encounter diagnosis)  -High blood pressure with abnormal BMI.  Recommendations as below.  -Up-to-date on vaccines  Plan: CBC with platelets, Comprehensive metabolic         panel (BMP + Alb, Alk Phos, ALT, AST, Total.         Bili, TP), Lipid panel reflex to direct LDL         Fasting          (R03.0) Elevated BP without diagnosis of hypertension  -No diagnosis of hypertension.  Blood pressures has been normal in previous visit from 2019.  -Discussed with the patient and recommended to follow-up in the clinic in 1 week as nurse only visit for blood pressure check.  -He stated that he is usually scared of needles and the thought of getting blood test today made him very anxious.  -Also notified that weight gain also could have contributed to increased blood pressure.  -He will also check home blood pressures.If home blood pressures are more than 140s/90s, will schedule clinic visit for blood pressure control.    (Z87.898) History of snoring  -Family history of EDWARDO.    Plan: Adult Sleep Eval & Management          Referral      (F33.41) Recurrent major depressive disorder, in partial remission (H)  -Stable, in partial remission.  Going to therapy once in a month.  Not on any medications.    (E66.01) Morbid obesity (H)  -Dat joined [employer] program for weight loss.  -He has been previously on a keto diet, lost 50 to 60 pounds,started taking regular diet 1 yr before and has regained his weight due to poor dietary habits.  -Highly motivated to work on weight this time.    (Z11.4) Screening for HIV (human immunodeficiency virus)  -Routine screening.  Plan: HIV Antigen Antibody Combo            (Z11.59) Need for hepatitis C screening test  -Routine screening.  Plan: Hepatitis C Screen Reflex to HCV RNA Quant and         Genotype             Patient has been advised of split  billing requirements and indicates understanding: Yes      COUNSELING:   Reviewed preventive health counseling, as reflected in patient instructions       Regular exercise       Healthy diet/nutrition        He reports that he quit smoking about 11 years ago. He has never used smokeless tobacco.            Brooks Diaz MD  [facility]

## 2023-01-16 ENCOUNTER — LAB (OUTPATIENT)
Dept: LAB | Facility: CLINIC | Age: 43
End: 2023-01-16
Payer: COMMERCIAL

## 2023-01-16 VITALS
HEIGHT: 71 IN | OXYGEN SATURATION: 99 % | WEIGHT: 289 LBS | BODY MASS INDEX: 40.46 KG/M2 | DIASTOLIC BLOOD PRESSURE: 88 MMHG | TEMPERATURE: 97.2 F | RESPIRATION RATE: 18 BRPM | SYSTOLIC BLOOD PRESSURE: 124 MMHG | HEART RATE: 93 BPM

## 2023-01-16 DIAGNOSIS — Z11.4 SCREENING FOR HIV (HUMAN IMMUNODEFICIENCY VIRUS): ICD-10-CM

## 2023-01-16 DIAGNOSIS — Z11.59 NEED FOR HEPATITIS C SCREENING TEST: ICD-10-CM

## 2023-01-16 DIAGNOSIS — Z00.00 ROUTINE GENERAL MEDICAL EXAMINATION AT A HEALTH CARE FACILITY: ICD-10-CM

## 2023-01-16 LAB
ALBUMIN SERPL BCG-MCNC: 4.6 G/DL (ref 3.5–5.2)
ALP SERPL-CCNC: 47 U/L (ref 40–129)
ALT SERPL W P-5'-P-CCNC: 28 U/L (ref 10–50)
ANION GAP SERPL CALCULATED.3IONS-SCNC: 15 MMOL/L (ref 7–15)
AST SERPL W P-5'-P-CCNC: 30 U/L (ref 10–50)
BILIRUB SERPL-MCNC: 0.8 MG/DL
BUN SERPL-MCNC: 11.8 MG/DL (ref 6–20)
CALCIUM SERPL-MCNC: 9.6 MG/DL (ref 8.6–10)
CHLORIDE SERPL-SCNC: 104 MMOL/L (ref 98–107)
CHOLEST SERPL-MCNC: 146 MG/DL
CREAT SERPL-MCNC: 1.06 MG/DL (ref 0.67–1.17)
DEPRECATED HCO3 PLAS-SCNC: 22 MMOL/L (ref 22–29)
ERYTHROCYTE [DISTWIDTH] IN BLOOD BY AUTOMATED COUNT: 12.8 % (ref 10–15)
GFR SERPL CREATININE-BSD FRML MDRD: 90 ML/MIN/1.73M2
GLUCOSE SERPL-MCNC: 108 MG/DL (ref 70–99)
HCT VFR BLD AUTO: 40.2 % (ref 40–53)
HCV AB SERPL QL IA: NONREACTIVE
HDLC SERPL-MCNC: 78 MG/DL
HGB BLD-MCNC: 14.4 G/DL (ref 13.3–17.7)
HIV 1+2 AB+HIV1 P24 AG SERPL QL IA: NONREACTIVE
LDLC SERPL CALC-MCNC: 58 MG/DL
MCH RBC QN AUTO: 30.2 PG (ref 26.5–33)
MCHC RBC AUTO-ENTMCNC: 35.8 G/DL (ref 31.5–36.5)
MCV RBC AUTO: 84 FL (ref 78–100)
NONHDLC SERPL-MCNC: 68 MG/DL
PLATELET # BLD AUTO: 179 10E3/UL (ref 150–450)
POTASSIUM SERPL-SCNC: 3.9 MMOL/L (ref 3.4–5.3)
PROT SERPL-MCNC: 6.8 G/DL (ref 6.4–8.3)
RBC # BLD AUTO: 4.77 10E6/UL (ref 4.4–5.9)
SODIUM SERPL-SCNC: 141 MMOL/L (ref 136–145)
TRIGL SERPL-MCNC: 49 MG/DL
TSH SERPL DL<=0.005 MIU/L-ACNC: 2.68 UIU/ML (ref 0.3–4.2)
WBC # BLD AUTO: 4.1 10E3/UL (ref 4–11)

## 2023-01-16 PROCEDURE — 36415 COLL VENOUS BLD VENIPUNCTURE: CPT

## 2023-01-16 PROCEDURE — 80053 COMPREHEN METABOLIC PANEL: CPT

## 2023-01-16 PROCEDURE — 86803 HEPATITIS C AB TEST: CPT

## 2023-01-16 PROCEDURE — 87389 HIV-1 AG W/HIV-1&-2 AB AG IA: CPT

## 2023-01-16 PROCEDURE — 85027 COMPLETE CBC AUTOMATED: CPT

## 2023-01-16 PROCEDURE — 84443 ASSAY THYROID STIM HORMONE: CPT

## 2023-01-16 PROCEDURE — 80061 LIPID PANEL: CPT

## 2023-12-04 ENCOUNTER — PATIENT OUTREACH (OUTPATIENT)
Dept: CARE COORDINATION | Facility: CLINIC | Age: 43
End: 2023-12-04
Payer: COMMERCIAL

## 2023-12-18 ENCOUNTER — PATIENT OUTREACH (OUTPATIENT)
Dept: CARE COORDINATION | Facility: CLINIC | Age: 43
End: 2023-12-18
Payer: COMMERCIAL

## 2024-01-18 ENCOUNTER — OFFICE VISIT (OUTPATIENT)
Dept: FAMILY MEDICINE | Facility: CLINIC | Age: 44
End: 2024-01-18
Payer: COMMERCIAL

## 2024-01-18 VITALS
DIASTOLIC BLOOD PRESSURE: 90 MMHG | HEIGHT: 71 IN | TEMPERATURE: 97.9 F | HEART RATE: 78 BPM | SYSTOLIC BLOOD PRESSURE: 134 MMHG | WEIGHT: 296 LBS | OXYGEN SATURATION: 100 % | RESPIRATION RATE: 16 BRPM | BODY MASS INDEX: 41.44 KG/M2

## 2024-01-18 DIAGNOSIS — Z00.00 ROUTINE GENERAL MEDICAL EXAMINATION AT A HEALTH CARE FACILITY: Primary | ICD-10-CM

## 2024-01-18 DIAGNOSIS — E66.01 MORBID OBESITY (H): ICD-10-CM

## 2024-01-18 DIAGNOSIS — F33.41 RECURRENT MAJOR DEPRESSIVE DISORDER, IN PARTIAL REMISSION (H): ICD-10-CM

## 2024-01-18 DIAGNOSIS — Z87.898 HISTORY OF SNORING: ICD-10-CM

## 2024-01-18 DIAGNOSIS — R03.0 ELEVATED BP WITHOUT DIAGNOSIS OF HYPERTENSION: ICD-10-CM

## 2024-01-18 PROCEDURE — 99396 PREV VISIT EST AGE 40-64: CPT | Performed by: FAMILY MEDICINE

## 2024-01-18 PROCEDURE — 96127 BRIEF EMOTIONAL/BEHAV ASSMT: CPT | Performed by: FAMILY MEDICINE

## 2024-01-18 PROCEDURE — 99213 OFFICE O/P EST LOW 20 MIN: CPT | Mod: 25 | Performed by: FAMILY MEDICINE

## 2024-01-18 ASSESSMENT — ENCOUNTER SYMPTOMS
ARTHRALGIAS: 0
PALPITATIONS: 0
CONSTIPATION: 0
HEMATURIA: 0
HEARTBURN: 0
SHORTNESS OF BREATH: 0
DIARRHEA: 0
FEVER: 0
DIZZINESS: 0
CHILLS: 0
JOINT SWELLING: 0
SORE THROAT: 0
EYE PAIN: 0
HEMATOCHEZIA: 0
COUGH: 0
MYALGIAS: 0
FREQUENCY: 0
HEADACHES: 0
PARESTHESIAS: 0
DYSURIA: 0
NERVOUS/ANXIOUS: 1
ABDOMINAL PAIN: 0
WEAKNESS: 0
NAUSEA: 0

## 2024-01-18 ASSESSMENT — PATIENT HEALTH QUESTIONNAIRE - PHQ9
10. IF YOU CHECKED OFF ANY PROBLEMS, HOW DIFFICULT HAVE THESE PROBLEMS MADE IT FOR YOU TO DO YOUR WORK, TAKE CARE OF THINGS AT HOME, OR GET ALONG WITH OTHER PEOPLE: SOMEWHAT DIFFICULT
SUM OF ALL RESPONSES TO PHQ QUESTIONS 1-9: 7
SUM OF ALL RESPONSES TO PHQ QUESTIONS 1-9: 7

## 2024-01-18 ASSESSMENT — PAIN SCALES - GENERAL: PAINLEVEL: NO PAIN (0)

## 2024-01-18 NOTE — PROGRESS NOTES
Preventive Care Visit  [facility]  Brooks Diaz MD, Family Medicine  2024      SUBJECTIVE:   Dat Yoder is a 43 year old, presenting for the following:  Physical        2024     9:28 AM   Additional Questions   Roomed by Danika   Accompanied by self     Healthy Habits:     Getting at least 3 servings of Calcium per day:  Yes    Bi-annual eye exam:  NO    Dental care twice a year:  Yes    Sleep apnea or symptoms of sleep apnea:  Daytime drowsiness, Excessive snoring and Sleep apnea    Diet:  Regular (no restrictions)    Frequency of exercise:  6-7 days/week    Duration of exercise:  45-60 minutes    Taking medications regularly:  Yes    Medication side effects:  None    Additional concerns today:  Yes    Today's PHQ-9 Score:       2024     9:29 AM   PHQ-9 SCORE   PHQ-9 Total Score MyChart 7 (Mild depression)   PHQ-9 Total Score 7                     Social History     Tobacco Use    Smoking status: Former     Types: Cigarettes     Quit date: 2012     Years since quittin.0    Smokeless tobacco: Never    Tobacco comments:     Social    Substance Use Topics    Alcohol use: Yes     Alcohol/week: 5.0 standard drinks of alcohol     Types: 5 Cans of beer per week     Comment: Socially - Weekends              2024     9:33 AM   Alcohol Use   Prescreen: >3 drinks/day or >7 drinks/week? Yes   AUDIT SCORE  5         2024     9:33 AM   AUDIT - Alcohol Use Disorders Identification Test - Reproduced from the World Health Organization Audit 2001 (Second Edition)   1.  How often do you have a drink containing alcohol? 2 to 3 times a week   2.  How many drinks containing alcohol do you have on a typical day when you are drinking? 1 or 2   3.  How often do you have five or more drinks on one occasion? Monthly   4.  How often during the last year have you found that you were not able to stop drinking once you had started? Never   5.  How often  "during the last year have you failed to do what was normally expected of you because of drinking? Never   6.  How often during the last year have you needed a first drink in the morning to get yourself going after a heavy drinking session? Never   7.  How often during the last year have you had a feeling of guilt or remorse after drinking? Never   8.  How often during the last year have you been unable to remember what happened the night before because of your drinking? Never   9.  Have you or someone else been injured because of your drinking? No   10. Has a relative, friend, doctor or other health care worker been concerned about your drinking or suggested you cut down? No   TOTAL SCORE 5       Last PSA: No results found for: \"PSA\"    Reviewed orders with patient. Reviewed health maintenance and updated orders accordingly - Yes  Lab work is in process    Reviewed and updated as needed this visit by clinical staff   Tobacco  Allergies  Meds              Reviewed and updated as needed this visit by Provider                      OBJECTIVE:   BP (!) 134/90 (BP Location: Left arm, Patient Position: Sitting, Cuff Size: Adult Large)   Pulse 78   Temp 97.9  F (36.6  C) (Oral)   Resp 16   Ht 1.802 m (5' 10.95\")   Wt 134.3 kg (296 lb)   SpO2 100%   BMI 41.35 kg/m     Estimated body mass index is 41.35 kg/m  as calculated from the following:    Height as of this encounter: 1.802 m (5' 10.95\").    Weight as of this encounter: 134.3 kg (296 lb).  Review of Systems   Constitutional:  Negative for chills and fever.   HENT:  Negative for congestion, ear pain, hearing loss and sore throat.    Eyes:  Negative for pain and visual disturbance.   Respiratory:  Negative for cough and shortness of breath.    Cardiovascular:  Negative for chest pain and palpitations.   Gastrointestinal:  Negative for abdominal pain, constipation, diarrhea and nausea.   Genitourinary:  Negative for dysuria, frequency, genital sores, hematuria " and urgency.   Musculoskeletal:  Negative for arthralgias, joint swelling and myalgias.   Skin:  Negative for rash.   Neurological:  Negative for dizziness, weakness and headaches.   Psychiatric/Behavioral:  The patient is nervous/anxious.        Physical Exam  GENERAL: alert and no distress  NECK: no adenopathy, no asymmetry, masses, or scars  RESP: lungs clear to auscultation - no rales, rhonchi or wheezes  CV: regular rate and rhythm, normal S1 S2, no S3 or S4, no murmur, click or rub, no peripheral edema  ABDOMEN: soft, nontender, no hepatosplenomegaly, no masses and bowel sounds normal  MS: no gross musculoskeletal defects noted, no edema    Diagnostic Test Results:  Labs reviewed in Epic    ASSESSMENT/PLAN:   Routine general medical examination at a health care facility  -Elevated blood pressure as addressed below.    - CBC with platelets; Future  - Basic metabolic panel  (Ca, Cl, CO2, Creat, Gluc, K, Na, BUN); Future  - ALT; Future  - Lipid panel reflex to direct LDL Fasting; Future  - Hemoglobin A1c; Future    Elevated BP without diagnosis of hypertension  -Dta reported that his home blood pressures are usually stable, coming to clinic and fear of needles is making blood pressures go up.  -Recommended DASH diet, keep checking blood pressures at home.  If is elevated more than 140/90 recommend to schedule clinic visit to start medications.    Morbid obesity (H)  -Dat has tried [employer] program, keto diet in the past and will lose significant pounds but will regain back again.  -Physically active, working on his diet .  -Discussed medical weight management due to BMI being more than 40,Dat was agreeable.  Referral sent to comprehensive weight management.    - Adult Comprehensive Weight Management  Referral; Future    History of snoring  -Family history of sleep apnea with personal history of snoring.  - Adult Sleep Eval & Management  Referral; Future    Recurrent major depressive disorder,  "in partial remission (H24)  -PHQ-9 score of 7    Patient has been advised of split billing requirements and indicates understanding: Yes      Counseling  Reviewed preventive health counseling, as reflected in patient instructions       Regular exercise       Healthy diet/nutrition      BMI  Estimated body mass index is 41.35 kg/m  as calculated from the following:    Height as of this encounter: 1.802 m (5' 10.95\").    Weight as of this encounter: 134.3 kg (296 lb).   Weight management plan: Patient referred to endocrine and/or weight management specialty      He reports that he quit smoking about 12 years ago. His smoking use included cigarettes. He has never used smokeless tobacco.            Signed Electronically by: Brooks Diaz MD  Answers submitted by the patient for this visit:  Patient Health Questionnaire (Submitted on 1/18/2024)  If you checked off any problems, how difficult have these problems made it for you to do your work, take care of things at home, or get along with other people?: Somewhat difficult  PHQ9 TOTAL SCORE: 7  Annual Preventive Visit (Submitted on 1/18/2024)  Chief Complaint: Annual Exam:  Blood in stool: No  heartburn: No  peripheral edema: No  mood changes: Yes  Skin sensation changes: No    "

## 2024-01-31 ENCOUNTER — LAB (OUTPATIENT)
Dept: LAB | Facility: CLINIC | Age: 44
End: 2024-01-31
Payer: COMMERCIAL

## 2024-01-31 DIAGNOSIS — Z00.00 ROUTINE GENERAL MEDICAL EXAMINATION AT A HEALTH CARE FACILITY: ICD-10-CM

## 2024-01-31 LAB
ALT SERPL W P-5'-P-CCNC: 44 U/L (ref 0–70)
ANION GAP SERPL CALCULATED.3IONS-SCNC: 8 MMOL/L (ref 7–15)
BUN SERPL-MCNC: 14.2 MG/DL (ref 6–20)
CALCIUM SERPL-MCNC: 9.2 MG/DL (ref 8.6–10)
CHLORIDE SERPL-SCNC: 105 MMOL/L (ref 98–107)
CHOLEST SERPL-MCNC: 152 MG/DL
CREAT SERPL-MCNC: 1.06 MG/DL (ref 0.67–1.17)
DEPRECATED HCO3 PLAS-SCNC: 28 MMOL/L (ref 22–29)
EGFRCR SERPLBLD CKD-EPI 2021: 90 ML/MIN/1.73M2
ERYTHROCYTE [DISTWIDTH] IN BLOOD BY AUTOMATED COUNT: 12.4 % (ref 10–15)
FASTING STATUS PATIENT QL REPORTED: YES
GLUCOSE SERPL-MCNC: 106 MG/DL (ref 70–99)
HBA1C MFR BLD: 4.9 % (ref 0–5.6)
HCT VFR BLD AUTO: 42.6 % (ref 40–53)
HDLC SERPL-MCNC: 60 MG/DL
HGB BLD-MCNC: 15 G/DL (ref 13.3–17.7)
LDLC SERPL CALC-MCNC: 81 MG/DL
MCH RBC QN AUTO: 30.2 PG (ref 26.5–33)
MCHC RBC AUTO-ENTMCNC: 35.2 G/DL (ref 31.5–36.5)
MCV RBC AUTO: 86 FL (ref 78–100)
NONHDLC SERPL-MCNC: 92 MG/DL
PLATELET # BLD AUTO: 231 10E3/UL (ref 150–450)
POTASSIUM SERPL-SCNC: 4.6 MMOL/L (ref 3.4–5.3)
RBC # BLD AUTO: 4.96 10E6/UL (ref 4.4–5.9)
SODIUM SERPL-SCNC: 141 MMOL/L (ref 135–145)
TRIGL SERPL-MCNC: 53 MG/DL
WBC # BLD AUTO: 5.1 10E3/UL (ref 4–11)

## 2024-01-31 PROCEDURE — 85027 COMPLETE CBC AUTOMATED: CPT

## 2024-01-31 PROCEDURE — 84460 ALANINE AMINO (ALT) (SGPT): CPT

## 2024-01-31 PROCEDURE — 36415 COLL VENOUS BLD VENIPUNCTURE: CPT

## 2024-01-31 PROCEDURE — 80048 BASIC METABOLIC PNL TOTAL CA: CPT

## 2024-01-31 PROCEDURE — 83036 HEMOGLOBIN GLYCOSYLATED A1C: CPT

## 2024-01-31 PROCEDURE — 80061 LIPID PANEL: CPT

## 2024-02-02 ENCOUNTER — OFFICE VISIT (OUTPATIENT)
Dept: SLEEP MEDICINE | Facility: CLINIC | Age: 44
End: 2024-02-02
Attending: FAMILY MEDICINE
Payer: COMMERCIAL

## 2024-02-02 VITALS
HEART RATE: 77 BPM | OXYGEN SATURATION: 98 % | DIASTOLIC BLOOD PRESSURE: 82 MMHG | HEIGHT: 71 IN | RESPIRATION RATE: 18 BRPM | BODY MASS INDEX: 39.06 KG/M2 | SYSTOLIC BLOOD PRESSURE: 140 MMHG | WEIGHT: 279 LBS

## 2024-02-02 DIAGNOSIS — R29.818 SUSPECTED SLEEP APNEA: Primary | ICD-10-CM

## 2024-02-02 DIAGNOSIS — E66.812 OBESITY, CLASS II, BMI 35-39.9: ICD-10-CM

## 2024-02-02 DIAGNOSIS — Z87.898 HISTORY OF SNORING: ICD-10-CM

## 2024-02-02 DIAGNOSIS — R03.0 ELEVATED BP WITHOUT DIAGNOSIS OF HYPERTENSION: ICD-10-CM

## 2024-02-02 PROBLEM — E66.01 MORBID OBESITY (H): Status: RESOLVED | Noted: 2023-01-02 | Resolved: 2024-02-02

## 2024-02-02 PROCEDURE — 99204 OFFICE O/P NEW MOD 45 MIN: CPT | Performed by: NURSE PRACTITIONER

## 2024-02-02 ASSESSMENT — SLEEP AND FATIGUE QUESTIONNAIRES
HOW LIKELY ARE YOU TO NOD OFF OR FALL ASLEEP IN A CAR, WHILE STOPPED FOR A FEW MINUTES IN TRAFFIC: WOULD NEVER DOZE
HOW LIKELY ARE YOU TO NOD OFF OR FALL ASLEEP WHILE SITTING AND READING: SLIGHT CHANCE OF DOZING
HOW LIKELY ARE YOU TO NOD OFF OR FALL ASLEEP WHILE WATCHING TV: MODERATE CHANCE OF DOZING
HOW LIKELY ARE YOU TO NOD OFF OR FALL ASLEEP WHILE SITTING INACTIVE IN A PUBLIC PLACE: SLIGHT CHANCE OF DOZING
HOW LIKELY ARE YOU TO NOD OFF OR FALL ASLEEP WHILE SITTING QUIETLY AFTER LUNCH WITHOUT ALCOHOL: SLIGHT CHANCE OF DOZING
HOW LIKELY ARE YOU TO NOD OFF OR FALL ASLEEP WHILE SITTING AND TALKING TO SOMEONE: WOULD NEVER DOZE
HOW LIKELY ARE YOU TO NOD OFF OR FALL ASLEEP WHILE LYING DOWN TO REST IN THE AFTERNOON WHEN CIRCUMSTANCES PERMIT: HIGH CHANCE OF DOZING
HOW LIKELY ARE YOU TO NOD OFF OR FALL ASLEEP WHEN YOU ARE A PASSENGER IN A CAR FOR AN HOUR WITHOUT A BREAK: MODERATE CHANCE OF DOZING

## 2024-02-02 NOTE — NURSING NOTE
"Chief Complaint   Patient presents with    Consult For    Sleep Problem       Initial BP (!) 140/82   Pulse 77   Resp 18   Ht 1.802 m (5' 10.95\")   Wt 126.6 kg (279 lb)   SpO2 98%   BMI 38.97 kg/m   Estimated body mass index is 38.97 kg/m  as calculated from the following:    Height as of this encounter: 1.802 m (5' 10.95\").    Weight as of this encounter: 126.6 kg (279 lb).    Medication Reconciliation: complete    Neck circumference:  inches / 41 centimeters.    DME:     Stephanie Osborn MA  [facility]    "

## 2024-02-02 NOTE — PROGRESS NOTES
Outpatient Sleep Medicine Consultation:      Name: Dat Yoder MRN# [id number]   Age: 43 year old YOB: 1980     Date of Consultation: February 2, 2024  Consultation is requested by: Brooks Diaz MD  [address]  [place]  [place] [address] Brooks Zendejas*  Primary care provider: Orestes Barrera       Reason for Sleep Consult:     Dat Yoder is sent by Brooks Zendejas* for a sleep consultation regarding snorng.    Patient s Reason for visit  Dat Yoder main reason for visit: snoring & sleep apnea  Patient states problem(s) started: years ago  Dat Yoder's goals for this visit: discuss options           Assessment and Plan:     Summary Sleep Diagnoses and Recommendations:  Presumed sleep apnea   HST, Watch Pat version    Comorbid Diagnoses:  Elevated blood pressure  Class II obesity  Depression, recurrent major     Summary Counseling:    Sleep Testing Reviewed  Obstructive Sleep Apnea Reviewed  Complications of Untreated Sleep Apnea Reviewed      Patient will follow up with a [employer] message after completing sleep study.  Patient will be contacted and therapy will be initiated if indicated  AMANDA Bains, CNP    Total time spent reviewing medical records, history and physical examination, review of previous testing and interpretation as well as documentation on this date:45 minutes    CC: Brooks Zendejas*          History of Present Illness:     Dat Yoder presents to the sleep medicine clinic with concerns      Weight has been a roller coaster BMI today is 38.97.  Hemoglobin A1c is 4.9  Familial Hx EDWARDO.  Father has obstructive sleep apnea, however he does not treat it.    STOP BANG 7/8    Elevated blood pressure, without the diagnosis of hypertension.  Today's blood pressure is 140/82.  Prior blood pressures have been 143/94 and 134/90.    Notes mild sleep initiation insomnia, send severe sleep maintenance  insomnia.  He is very dissatisfied with the quality of his sleep.  MARISA score= 15/28    Patient states he is able to fall asleep and no time at all.  He has 3+ nocturnal awakenings for snort arousals, external stimulation, anxiety.  At times has difficult to returning to sleep after awakening.  Estimates he obtains about 6 hours so fragmented sleep.  Prefers to sleep supine or laterally.    Patient likes to sleep as often as he can.  This amounts to approximately twice a week for 20-30 minutes.  He does feel better after his nap.  He notes that he does fall asleep inadvertently 3 times per week mostly at night.    Suffers from loud snoring, snort arousals, witnessed episodes of apnea.  Notes he also suffers some nasal congestion upon awakening, depression and anxiety related to sleep.    Dat has a high pretest probability for sleep apnea.  We did discuss consequences of untreated sleep apnea in the context of his mood disorder and blood pressure.  Plans for him include a home sleep test.  He will contact me with a [employer] message after he has completed his study, after the such, I will notify him upon availability of his results.  At that time we will collaborate together and determine the next steps in his plan of care.  He is not opposed to CPAP therapy.    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  Wakes up due to Snorting self awake;External stimuli (bed partner, pets, noise, etc);Anxiety  He has trouble falling back asleep   times a week.   It usually takes   to get back to sleep  Patient is  usually up at 5am  Uses alarm: Yes    Weekends/Non-work Days/All Other Days:  Usually gets into bed at 9pm   Takes patient about no time to fall asleep  Patient is usually up at 5am  Uses alarm: Yes    Sleep Need  Patient gets  ~6 hours sleep on average   Patient thinks he needs about 8 hours sleep    Dat Yoder prefers to sleep in this position(s): Back;Side   Patient states they do the following activities in bed: Read;Use phone, computer, or tablet;Work    Naps  Patient takes a purposeful nap as often as possible ~2 times a week times a week and naps are usually 20-30min in duration  He feels better after a nap: Yes  He dozes off unintentionally 3 times per week at night days per week Falls asleep on couch at the end of thhe day   Patient has had a driving accident or near-miss due to sleepiness/drowsiness: No      SLEEP DISRUPTIONS:    Breathing/Snoring  Patient snores:Yes  Other people complain about his snoring: Yes  Patient has been told he stops breathing in his sleep:Yes  He has issues with the following: Stuffy nose when you wake up.     Movement:  Patient gets pain, discomfort, with an urge to move:  No restless legs symptoms  It happens when he is resting:  No  It happens more at night:  No  Patient has been told he kicks his legs at night:  No     Behaviors in Sleep:  Dat Yoder has experienced the following behaviors while sleeping:    He has experienced sudden muscle weakness during the day: No  Pt denies bruxism, sleep talking, sleep walking, and dream enactment behavior. Pt denies sleep paralysis, hypnagogue and cataplexy.       Is there anything else you would like your sleep provider to know:        CAFFEINE AND OTHER SUBSTANCES:    Patient consumes caffeinated beverages per day:  pre workout  Last caffeine use is usually: AM  List of any prescribed or over the counter stimulants that patient takes: pre workout  List of any prescribed or over the counter sleep medication patient takes:  none  List of previous sleep medications that patient has tried: melatonin  Patient drinks alcohol to help them sleep: No  Patient drinks alcohol near bedtime: Yes    Family History:  Patient has a family member been diagnosed with a sleep disorder: Yes  father Does not treat his EDWARDO        SCALES:    EPWORTH SLEEPINESS SCALE         2/2/2024     2:06 PM    [place] Sleepiness Scale ( JULIO CESAR Modi  [id number]<br>ESS - [place]/English - Final version - 21 Nov 07 - [facility] [employer].)   Sitting and reading Slight chance of dozing   Watching TV Moderate chance of dozing   Sitting, inactive in a public place (e.g. a theatre or a meeting) Slight chance of dozing   As a passenger in a car for an hour without a break Moderate chance of dozing   Lying down to rest in the afternoon when circumstances permit High chance of dozing   Sitting and talking to someone Would never doze   Sitting quietly after a lunch without alcohol Slight chance of dozing   In a car, while stopped for a few minutes in traffic Would never doze   [place] Score (MC) 10   [place] Score (Sleep) 10         INSOMNIA SEVERITY INDEX (MARISA)          2/2/2024     1:58 PM   Insomnia Severity Index (MARISA)   Difficulty falling asleep 1   Difficulty staying asleep 3   Problems waking up too early 2   How SATISFIED/DISSATISFIED are you with your CURRENT sleep pattern? 3   How NOTICEABLE to others do you think your sleep problem is in terms of impairing the quality of your life? 2   How WORRIED/DISTRESSED are you about your current sleep problem? 2   To what extent do you consider your sleep problem to INTERFERE with your daily functioning (e.g. daytime fatigue, mood, ability to function at work/daily chores, concentration, memory, mood, etc.) CURRENTLY? 2   MARISA Total Score 15       Guidelines for Scoring/Interpretation:  Total score categories:  0-7 = No clinically significant insomnia   8-14 = Subthreshold insomnia   15-21 = Clinical insomnia (moderate  "severity)  22-28 = Clinical insomnia (severe)  Used via courtesy of www.myhealth.va.gov with permission from Trevon Hernandez PhD., [facility]      STOP BANG         2/2/2024     2:07 PM   STOP BANG Questionnaire (  2008, the American Society of Anesthesiologists, Inc. [employer].)   1. Snoring - Do you snore loudly (louder than talking or loud enough to be heard through closed doors)? Yes   2. Tired - Do you often feel tired, fatigued, or sleepy during daytime? Yes   3. Observed - Has anyone observed you stop breathing during your sleep? Yes   4. Blood pressure - Do you have or are you being treated for high blood pressure? No   5. BMI - BMI more than 35 kg/m2? Yes   6. Age - Age over 50 yr old? No   7. Neck circumference - Neck circumference greater than 40 cm? No   8. Gender - Gender male? Yes   STOP BANG Score (MC): 4 (High risk of EDWARDO)         GAD7         No data to display                  CAGE-AID         No data to display                CAGE-AID reprinted with permission from the [place] Medical Journal, EMELIA Cagle. and INDIRA Nicole, \"Conjoint screening questionnaires for alcohol and drug abuse\" [place] Medical Journal 94: 135-140, 1995.      PATIENT HEALTH QUESTIONNAIRE-9 (PHQ - 9)        1/18/2024     9:29 AM   PHQ-9 ([employer])   1.  Little interest or pleasure in doing things 1   2.  Feeling down, depressed, or hopeless 1   3.  Trouble falling or staying asleep, or sleeping too much 1   4.  Feeling tired or having little energy 1   5.  Poor appetite or overeating 1   6.  Feeling bad about yourself - or that you are a failure or have let yourself or your family down 1   7.  Trouble concentrating on things, such as reading the newspaper or watching television 1   8.  Moving or speaking so slowly that other people could have noticed. Or the opposite - being so fidgety or restless that you have been moving around a lot more than usual 0   9.  Thoughts that you would be better " off dead, or of hurting yourself in some way 0   PHQ-9 Total Score 7   6.  Feeling bad about yourself 1   7.  Trouble concentrating 1   8.  Moving slowly or restless 0   9.  Suicidal or self-harm thoughts 0   1.  Little interest or pleasure in doing things Several days   2.  Feeling down, depressed, or hopeless Several days   3.  Trouble falling or staying asleep, or sleeping too much Several days   4.  Feeling tired or having little energy Several days   5.  Poor appetite or overeating Several days   6.  Feeling bad about yourself Several days   7.  Trouble concentrating Several days   8.  Moving slowly or restless Not at all   9.  Suicidal or self-harm thoughts Not at all   PHQ-9 via [employer] TOTAL SCORE-----> 7 (Mild depression)   Difficulty at work, home, or with people Somewhat difficult       Developed by Dc Duncan, Ondina Beaulieu, Brennon Clement and colleagues, with an educational bing from [employer] No permission required to reproduce, translate, display or distribute.        Allergies:    No Known Allergies    Medications:    Current Outpatient Medications   Medication Sig Dispense Refill    Multiple Vitamins-Minerals (MULTIVITAMIN ADULT PO)          Problem List:  Patient Active Problem List    Diagnosis Date Noted    Recurrent major depressive disorder, in partial remission (H24) 01/18/2024     Priority: Medium    Morbid obesity (H) 01/02/2023     Priority: Medium    CARDIOVASCULAR SCREENING; LDL GOAL LESS THAN 160 05/09/2010     Priority: Medium    Class 1 obesity due to excess calories with body mass index (BMI) of 32.0 to 32.9 in adult 10/17/2007     Priority: Medium     Problem list name updated by automated process. Provider to review          Past Medical/Surgical History:  Past Medical History:   Diagnosis Date    Priapism 2003    Resolved    Tobacco use disorder     Social smoker. Started smoking at 19 years and quite in 2012     Past Surgical History:   Procedure Laterality Date     SURGICAL HISTORY OF -       correction of priapism x 2       Social History:  Social History     Socioeconomic History    Marital status:      Spouse name: Engaged -     Number of children: 0    Years of education: Not on file    Highest education level: Not on file   Occupational History    Occupation: Photo Rep-  - Marketing     Employer: [employer]   Tobacco Use    Smoking status: Former     Types: Cigarettes     Quit date: 2012     Years since quittin.0    Smokeless tobacco: Never    Tobacco comments:     Social    Substance and Sexual Activity    Alcohol use: Yes     Alcohol/week: 5.0 standard drinks of alcohol     Types: 5 Cans of beer per week     Comment: Socially - Weekends     Drug use: No     Comment: marijuana    Sexual activity: Yes     Partners: Female   Other Topics Concern    Parent/sibling w/ CABG, MI or angioplasty before 65F 55M? No     Service Not Asked    Blood Transfusions Not Asked    Caffeine Concern Not Asked     Comment: Coffee - 1 cup of coffee a day    Occupational Exposure Not Asked    Hobby Hazards Not Asked    Sleep Concern Not Asked    Stress Concern Not Asked    Weight Concern Not Asked    Special Diet Not Asked    Back Care Not Asked    Exercise Not Asked     Comment: Lifting and Cardio - 5 times a week. Concentrating on body lifting    Bike Helmet Not Asked    Seat Belt Not Asked    Self-Exams Not Asked   Social History Narrative    Dairy/d 0-2 servings/d.     Caffeine 1 servings/d    Exercise 7 x week 1 hour per day.     Sunscreen used - Yes    Seatbelts used - Yes    Working smoke/CO detectors in the home - No    Guns stored in the home - Yes    Self Breast Exams - NA    Self Testicular Exam - Yes    Eye Exam up to date - No    Dental Exam up to date - Yes    Pap Smear up to date - NA    Mammogram up to date - NA    PSA up to date - No    Dexa Scan up to date - No    Flex Sig / Colonoscopy up to date - No    Immunizations up to date - Unsure     Abuse: Current or Past(Physical, Sexual or Emotional)- No    Do you feel safe in your environment - Yes    David Mckeon MA    10/17/07             Social Determinants of Health     Financial Resource Strain: Low Risk  (1/18/2024)    Financial Resource Strain     Within the past 12 months, have you or your family members you live with been unable to get utilities (heat, electricity) when it was really needed?: No   Food Insecurity: Low Risk  (1/18/2024)    Food Insecurity     Within the past 12 months, did you worry that your food would run out before you got money to buy more?: No     Within the past 12 months, did the food you bought just not last and you didn t have money to get more?: No   Transportation Needs: Low Risk  (1/18/2024)    Transportation Needs     Within the past 12 months, has lack of transportation kept you from medical appointments, getting your medicines, non-medical meetings or appointments, work, or from getting things that you need?: No   Physical Activity: Not on file   Stress: Not on file   Social Connections: Not on file   Interpersonal Safety: Low Risk  (1/18/2024)    Interpersonal Safety     Do you feel physically and emotionally safe where you currently live?: Yes     Within the past 12 months, have you been hit, slapped, kicked or otherwise physically hurt by someone?: No     Within the past 12 months, have you been humiliated or emotionally abused in other ways by your partner or ex-partner?: No   Housing Stability: Low Risk  (1/18/2024)    Housing Stability     Do you have housing? : Yes     Are you worried about losing your housing?: No       Family History:  Family History   Problem Relation Age of Onset    Breast Cancer Mother 50    Obesity Mother     Thyroid Disease Mother     Lipids Father     Obesity Father     Hypertension Father     Crohn's Disease Sister     Respiratory Maternal Grandmother     Cancer Maternal Grandfather         Brain Cancer    Cancer Paternal Grandfather   "       Brain Cancer       Review of Systems:  A complete review of systems reviewed by me is negative with the exeption of what has been mentioned in the history of present illness.        Physical Examination:  Vitals: BP (!) 140/82   Pulse 77   Resp 18   Ht 1.802 m (5' 10.95\")   Wt 126.6 kg (279 lb)   SpO2 98%   BMI 38.97 kg/m    BMI= Body mass index is 38.97 kg/m .         Physical Exam  Vitals and nursing note reviewed.   Constitutional:       General: He is awake. He is not in acute distress.     Appearance: Normal appearance. He is well-developed and well-groomed. He is obese. He is not ill-appearing, toxic-appearing or diaphoretic.   HENT:      Head: Normocephalic and atraumatic.      Right Ear: External ear normal.      Left Ear: External ear normal.      Nose: Nose normal.      Mouth/Throat:      Mouth: Mucous membranes are moist.      Pharynx: Oropharynx is clear.   Eyes:      Conjunctiva/sclera: Conjunctivae normal.   Cardiovascular:      Rate and Rhythm: Normal rate and regular rhythm.      Pulses: Normal pulses.      Heart sounds: Normal heart sounds.   Pulmonary:      Effort: Pulmonary effort is normal.      Breath sounds: Normal breath sounds.   Musculoskeletal:         General: Normal range of motion.      Cervical back: Normal range of motion and neck supple.   Skin:     General: Skin is warm and dry.      Capillary Refill: Capillary refill takes less than 2 seconds.   Neurological:      General: No focal deficit present.      Mental Status: He is alert and oriented to person, place, and time.   Psychiatric:         Mood and Affect: Mood normal.         Behavior: Behavior normal. Behavior is cooperative.         Thought Content: Thought content normal.         Judgment: Judgment normal.           Mallampati Class: III.  Tonsillar Stage: 1  hidden by pillars.    All Labs Personally Reviewed         Data: All pertinent previous laboratory data reviewed     Recent Labs   Lab Test 01/31/24  0737 " "01/16/23  0819    141   POTASSIUM 4.6 3.9   CHLORIDE 105 104   CO2 28 22   ANIONGAP 8 15   * 108*   BUN 14.2 11.8   CR 1.06 1.06   KIMBERLEY 9.2 9.6       Recent Labs   Lab Test 01/31/24  0737   WBC 5.1   RBC 4.96   HGB 15.0   HCT 42.6   MCV 86   MCH 30.2   MCHC 35.2   RDW 12.4          Recent Labs   Lab Test 01/31/24  0737 01/16/23  0819   PROTTOTAL  --  6.8   ALBUMIN  --  4.6   BILITOTAL  --  0.8   ALKPHOS  --  47   AST  --  30   ALT 44 28       TSH   Date Value   01/16/2023 2.68 uIU/mL   12/09/2019 3.01 mU/L   04/26/2016 2.93 mU/L       Opiates Qualitative Urine (no units)   Date Value   05/06/2003     Not Detected           Reference Range:  Not Detected       No results found for: \"IRONSAT\", \"[id number]\", \"SAM\"    No results found for: \"PH\", \"PHARTERIAL\", \"PO2\", \"KE1RYJHMEEX\", \"SAT\", \"PCO2\", \"HCO3\", \"BASEEXCESS\", \"ARLEEN\", \"BEB\"    @LABRCNTIPR(phv:4,pco2v:4,po2v:4,hco3v:4,shola:4,o2per:4)@    Echocardiology: No results found for this or any previous visit (from the past 4320 hour(s)).    Chest x-ray: No results found for this or any previous visit from the past 365 days.      Chest CT: No results found for this or any previous visit from the past 365 days.      PFT: Most Recent B[facility]ze Pulmonary Function Testing    No results found for: \"20001\"  No results found for: \"20002\"  No results found for: \"20003\"  No results found for: \"20015\"  No results found for: \"20016\"  No results found for: \"20027\"  No results found for: \"20028\"  No results found for: \"20029\"  No results found for: \"20079\"  No results found for: \"20080\"  No results found for: \"20081\"  No results found for: \"20335\"  No results found for: \"20105\"  No results found for: \"20053\"  No results found for: \"20054\"  No results found for: \"20055\"      AMANDA Forrest CNP 2/2/2024   Sleep Medicine    This note was written with the assistance of the Dragon voice-dictation technology software. The final document, although reviewed, may contain " errors. For corrections, please contact the office.

## 2024-02-02 NOTE — NURSING NOTE
Watch Pat Mail Out and Return Visit has been Scheduled. Watch Pat Information sent to Patient Via [employer]. [facility] Osborn MA  [facility]

## 2024-02-02 NOTE — PATIENT INSTRUCTIONS
"          MY TREATMENT INFORMATION FOR SLEEP APNEA-  Dat [facility]oder    DOCTOR : AMANDA Forrest CNP    Am I having a sleep study at a sleep center?  --->Due to normal delays, you will be contacted within 2-4 weeks to schedule    Am I having a home sleep study?  --->Watch the video for the device you are using:    -/drop off device-   https://www.MessageOne.com/watch?v=yGGFBdELGhk    -Disposable device sent out require phone/computer application-   https://www.MessageOne.com/watch?v=BCce_vbiwxE      Frequently asked questions:  1. What is Obstructive Sleep Apnea (EDWARDO)? EDWARDO is the most common type of sleep apnea. Apnea means, \"without breath.\"  Apnea is most often caused by narrowing or collapse of the upper airway as muscles relax during sleep.   Almost everyone has occasional apneas. Most people with sleep apnea have had brief interruptions at night frequently for many years.  The severity of sleep apnea is related to how frequent and severe the events are.   2. What are the consequences of EDWARDO? Symptoms include: feeling sleepy during the day, snoring loudly, gasping or stopping of breathing, trouble sleeping, and occasionally morning headaches or heartburn at night.  Sleepiness can be serious and even increase the risk of falling asleep while driving. Other health consequences may include development of high blood pressure and other cardiovascular disease in persons who are susceptible. Untreated EDWARDO  can contribute to heart disease, stroke and diabetes.   3. What are the treatment options? In most situations, sleep apnea is a lifelong disease that must be managed with daily therapy. Medications are not effective for sleep apnea and surgery is generally not considered until other therapies have been tried. Your treatment is your choice . Continuous Positive Airway (CPAP) works right away and is the therapy that is effective in nearly everyone. An oral device to hold your jaw forward is usually the next " most reliable option. Other options include postioning devices (to keep you off your back), weight loss, and surgery including a tongue pacing device. There is more detail about some of these options below.  4. Are my sleep studies covered by insurance? Although we will request verification of coverage, we advise you also check in advance of the study to ensure there is coverage.    Important tips for those choosing CPAP and similar devices  For new devices, sign up for device FIDELIA to monitor your device for your followup visits  We encourage you to utilize the codesy fidelia or website (myAir web (resmed.com) ) to monitor your therapy progress and share the data with your healthcare team when you discuss your sleep apnea.                                                    Know your equipment:  CPAP is continuous positive airway pressure that prevents obstructive sleep apnea by keeping the throat from collapsing while you are sleeping. In most cases, the device is  smart  and can slowly self-adjusts if your throat collapses and keeps a record every day of how well you are treated-this information is available to you and your care team.  BPAP is bilevel positive airway pressure that keeps your throat open and also assists each breath with a pressure boost to maintain adequate breathing.  Special kinds of BPAP are used in patients who have inadequate breathing from lung or heart disease. In most cases, the device is  smart  and can slowly self-adjusts to assist breathing. Like CPAP, the device keeps a record of how well you are treated.  Your mask is your connection to the device. You get to choose what feels most comfortable and the staff will help to make sure if fits. Here: are some examples of the different masks that are available: Magnetic mask aids may assist with use but there are safety issues that should be addressed when considering with magnets* ( see end of discussion).       Key points to remember on  your journey with sleep apnea:  Sleep study.  PAP devices often need to be adjusted during a sleep study to show that they are effective and adjusted right.  Good tips to remember: Try wearing just the mask during a quiet time during the day so your body adapts to wearing it. A humidifier is recommended for comfort in most cases to prevent drying of your nose and throat. Allergy medication from your provider may help you if you are having nasal congestion.  Getting settled-in. It takes more than one night for most of us to get used to wearing a mask. Try wearing just the mask during a quiet time during the day so your body adapts to wearing it. A humidifier is recommended for comfort in most cases. Our team will work with you carefully on the first day and will be in contact within 4 days and again at 2 and 4 weeks for advice and remote device adjustments. Your therapy is evaluated by the device each day.   Use it every night. The more you are able to sleep naturally for 7-8 hours, the more likely you will have good sleep and to prevent health risks or symptoms from sleep apnea. Even if you use it 4 hours it helps. Occasionally all of us are unable to use a medical therapy, in sleep apnea, it is not dangerous to miss one night.   Communicate. Call our skilled team on the number provided on the first day if your visit for problems that make it difficult to wear the device. Over 2 out of 3 patients can learn to wear the device long-term with help from our team. Remember to call our team or your sleep providers if you are unable to wear the device as we may have other solutions for those who cannot adapt to mask CPAP therapy. It is recommended that you sleep your sleep provider within the first 3 months and yearly after that if you are not having problems.   Use it for your health. We encourage use of CPAP masks during daytime quiet periods to allow your face and brain to adapt to the sensation of CPAP so that it will  be a more natural sensation to awaken to at night or during naps. This can be very useful during the first few weeks or months of adapting to CPAP though it does not help medically to wear CPAP during wakefulness and  should not be used as a strategy just to meet guidelines.  Take care of your equipment. Make sure you clean your mask and tubing using directions every day and that your filter and mask are replaced as recommended or if they are not working.     *Masks with magnets:  Updated Contraindications  Masks with magnetic components are contraindicated for use by patients where they, or anyone in close physical contact while using the mask, have the following:   Active medical implants that interact with magnets (i.e., pacemakers, implantable cardioverter defibrillators (ICD), neurostimulators, cerebrospinal fluid (CSF) shunts, insulin/infusion pumps)   Metallic implants/objects containing ferromagnetic material (i.e., aneurysm clips/flow disruption devices, embolic coils, stents, valves, electrodes, implants to restore hearing or balance with implanted magnets, ocular implants, metallic splinters in the eye)  Updated Warning  Keep the mask magnets at a safe distance of at least 6 inches (150 mm) away from implants or medical devices that may be adversely affected by magnetic interference. This warning applies to you or anyone in close physical contact with your mask. The magnets are in the frame and lower headgear clips, with a magnetic field strength of up to 400mT. When worn, they connect to secure the mask but may inadvertently detach while asleep.  Implants/medical devices, including those listed within contraindications, may be adversely affected if they change function under external magnetic fields or contain ferromagnetic materials that attract/repel to magnetic fields (some metallic implants, e.g., contact lenses with metal, dental implants, metallic cranial plates, screws, rafia hole covers, and bone  substitute devices). Consult your physician and  of your implant / other medical device for information on the potential adverse effects of magnetic fields.    BESIDES CPAP, WHAT OTHER THERAPIES ARE THERE?    Positioning Device  Positioning devices are generally used when sleep apnea is mild and only occurs on your back.This example shows a pillow that straps around the waist. It may be appropriate for those whose sleep study shows milder sleep apnea that occurs primarily when lying flat on one's back. Preliminary studies have shown benefit but effectiveness at home may need to be verified by a home sleep test. These devices are generally not covered by medical insurance.  Examples of devices that maintain sleeping on the back to prevent snoring and mild sleep apnea.    Belt type body positioner  http://Punchd/    Electronic reminder  http://nightshifttherapy.com/            Oral Appliance  What is oral appliance therapy?  An oral appliance device fits on your teeth at night like a retainer used after having braces. The device is made by a specialized dentist and requires several visits over 1-2 months before a manufactured device is made to fit your teeth and is adjusted to prevent your sleep apnea. Once an oral device is working properly, snoring should be improved. A home sleep test may be recommended at that time if to determine whether the sleep apnea is adequately treated.       Some things to remember:  -Oral devices are often, but not always, covered by your medical insurance. Be sure to check with your insurance provider.   -If you are referred for oral therapy, you will be given a list of specialized dentists to consider or you may choose to visit the Web site of the American Academy of Dental Sleep Medicine  -Oral devices are less likely to work if you have severe sleep apnea or are extremely overweight.     More detailed information  An oral appliance is a small acrylic device that fits  over the upper and lower teeth  (similar to a retainer or a mouth guard). This device slightly moves jaw forward, which moves the base of the tongue forward, opens the airway, improves breathing for effective treat snoring and obstructive sleep apnea in perhaps 7 out of 10 people .  The best working devices are custom-made by a dental device  after a mold is made of the teeth 1, 2, 3.  When is an oral appliance indicated?  Oral appliance therapy is recommended as a first-line treatment for patients with primary snoring, mild sleep apnea, and for patients with moderate sleep apnea who prefer appliance therapy to use of CPAP4, 5. Severity of sleep apnea is determined by sleep testing and is based on the number of respiratory events per hour of sleep.   How successful is oral appliance therapy?  The success rate of oral appliance therapy in patients with mild sleep apnea is 75-80% while in patients with moderate sleep apnea it is 50-70%. The chance of success in patients with severe sleep apnea is 40-50%. The research also shows that oral appliances have a beneficial effect on the cardiovascular health of EDWARDO patients at the same magnitude as CPAP therapy7.  Oral appliances should be a second-line treatment in cases of severe sleep apnea, but if not completely successful then a combination therapy utilizing CPAP plus oral appliance therapy may be effective. Oral appliances tend to be effective in a broad range of patients although studies show that the patients who have the highest success are females, younger patients, those with milder disease, and less severe obesity. 3, 6.   Finding a dentist that practices dental sleep medicine  Specific training is available through the American Academy of Dental Sleep Medicine for dentists interested in working in the field of sleep. To find a dentist who is educated in the field of sleep and the use of oral appliances, near you, visit the Web site of the American  Academy of Dental Sleep Medicine.    References  1. Dwain et al. Objectively measured vs self-reported compliance during oral appliance therapy for sleep-disordered breathing. Chest 2013; 144(5): 4535-2791.  2. Domo et al. Objective measurement of compliance during oral appliance therapy for sleep-disordered breathing. Thorax 2013; 68(1): 91-96.  3. Benson, et al. Mandibular advancement devices in 620 men and women with EDWARDO and snoring: tolerability and predictors of treatment success. Chest 2004; 125: 0756-1677.  4. Noe et al. Oral appliances for snoring and EDWARDO: a review. Sleep 2006; 29: 244-262.  5. El et al. Oral appliance treatment for EDWARDO: an update. J Clin Sleep Med 2014; 10(2): 215-227.  6. Rikki et al. Predictors of OSAH treatment outcome. J Dent Res 2007; 86: 1585-1830.      Weight Loss:    Weight loss is a long-term strategy that may improve sleep apnea in some patients.    Weight management is a personal decision and the decision should be based on your interest and the potential benefits.  If you are interested in exploring weight loss strategies, the following discussion covers the impact on weight loss on sleep apnea and the approaches that may be successful.    Being overweight does not necessarily mean you will have health consequences.  Those who have BMI over 35 or over 27 with existing medical conditions carries greater risk.   Weight loss decreases severity of sleep apnea in most people with obesity. For those with mild obesity who have developed snoring with weight gain, even 15-30 pound weight loss can improve and occasionally eliminate sleep apnea.  Structured and life-long dietary and health habits are necessary to lose weight and keep healthier weight levels.     Though there may be significant health benefits from weight loss, long-term weight loss is very difficult to achieve- studies show success with dietary management in less than 10% of people. In  addition, substantial weight loss may require years of dietary control and may be difficult if patients have severe obesity. In these cases, surgical management may be considered.  Finally, older individuals who have tolerated obesity without health complications may be less likely to benefit from weight loss strategies.      Body mass index is 38.97 kg/m .    Surgery:    Surgery for obstructive sleep apnea is considered generally only when other therapies fail to work. Surgery may be discussed with you if you are having a difficult time tolerating CPAP and or when there is an abnormal structure that requires surgical correction.  Nose and throat surgeries often enlarge the airway to prevent collapse.  Most of these surgeries create pain for 1-2 weeks and up to half of the most common surgeries are not effective throughout life.  You should carefully discuss the benefits and drawbacks to surgery with your sleep provider and surgeon to determine if it is the best solution for you.   More information  Surgery for EDWARDO is directed at areas that are responsible for narrowing or complete obstruction of the airway during sleep.  There are a wide range of procedures available to enlarge and/or stabilize the airway to prevent blockage of breathing in the three major areas where it can occur: the palate, tongue, and nasal regions.  Successful surgical treatment depends on the accurate identification of the factors responsible for obstructive sleep apnea in each person.  A personalized approach is required because there is no single treatment that works well for everyone.  Because of anatomic variation, consultation with an examination by a sleep surgeon is a critical first step in determining what surgical options are best for each patient.  In some cases, examination during sedation may be recommended in order to guide the selection of procedures.  Patients will be counseled about risks and benefits as well as the typical  recovery course after surgery. Surgery is typically not a cure for a person s EDWARDO.  However, surgery will often significantly improve one s EDWARDO severity (termed  success rate ).  Even in the absence of a cure, surgery will decrease the cardiovascular risk associated with OSA7; improve overall quality of life8 (sleepiness, functionality, sleep quality, etc).      Palate Procedures:  Patients with EDWARDO often have narrowing of their airway in the region of their tonsils and uvula.  The goals of palate procedures are to widen the airway in this region as well as to help the tissues resist collapse.  Modern palate procedure techniques focus on tissue conservation and soft tissue rearrangement, rather than tissue removal.  Often the uvula is preserved in this procedure. Residual sleep apnea is common in patient after pharyngoplasty with an average reduction in sleep apnea events of 33%2.      Tongue Procedures:  ExamWhile patients are awake, the muscles that surround the throat are active and keep this region open for breathing. These muscles relax during sleep, allowing the tongue and other structures to collapse and block breathing.  There are several different tongue procedures available.  Selection of a tongue base procedure depends on characteristics seen on physical exam.  Generally, procedures are aimed at removing bulky tissues in this area or preventing the back of the tongue from falling back during sleep.  Success rates for tongue surgery range from 50-62%3.    Hypoglossal Nerve Stimulation:  Hypoglossal nerve stimulation has recently received approval from the [place] Food and Drug Administration for the treatment of obstructive sleep apnea.  This is based on research showing that the system was safe and effective in treating sleep apnea6.  Results showed that the median AHI score decreased 68%, from 29.3 to 9.0. This therapy uses an implant system that senses breathing patterns and delivers mild  stimulation to airway muscles, which keeps the airway open during sleep.  The system consists of three fully implanted components: a small generator (similar in size to a pacemaker), a breathing sensor, and a stimulation lead.  Using a small handheld remote, a patient turns the therapy on before bed and off upon awakening.    Candidates for this device must be greater than 18 years of age, have moderate to severe obstructive sleep apnea with less than 25% central events  (AHI between 15-65), BMI less than 35, have tried CPAP/oral appliance for at least 8 weeks without success, and have appropriate upper airway anatomy (determined by a sleep endoscopy performed by Dr. Evelio Ramirez or Dr. Fredrick Amezcua).    Nasal Procedures:  Nasal obstruction can interfere with nasal breathing during the day and night.  Studies have shown that relief of nasal obstruction can improve the ability of some patients to tolerate positive airway pressure therapy for obstructive sleep apnea1.  Treatment options include medications such as nasal saline, topical corticosteroid and antihistamine sprays, and oral medications such as antihistamines or decongestants. Non-surgical treatments can include external nasal dilators for selected patients. If these are not successful by themselves, surgery can improve the nasal airway either alone or in combination with these other options.        Combination Procedures:  Combination of surgical procedures and other treatments may be recommended, particularly if patients have more than one area of narrowing or persistent positional disease.  The success rate of combination surgery ranges from 66-80%2,3.    References  Michelle WHITT. The Role of the Nose in Snoring and Obstructive Sleep Apnoea: An Update.  Eur Arch Otorhinolaryngol. 2011; 268: 1365-73.   Kelvin SM; Dominga JA; Narendra JR; Pallanch JF; Xavier BARTON; Beverly JUARES; Leyda CONNOR. Surgical modifications of the upper airway for obstructive sleep apnea in  adults: a systematic review and meta-analysis. SLEEP 2010;33(10):0239-4583. Tatiana LOPEZ. Hypopharyngeal surgery in obstructive sleep apnea: an evidence-based medicine review.  Arch Otolaryngol Head Neck Surg. 2006 Feb;132(2):206-13.  Larry YH1, Myles Y, Justin WILMER. The efficacy of anatomically based multilevel surgery for obstructive sleep apnea. Otolaryngol Head Neck Surg. 2003 Oct;129(4):327-35.  Kezirian E, Goldberg A. Hypopharyngeal Surgery in Obstructive Sleep Apnea: An Evidence-Based Medicine Review. Arch Otolaryngol Head Neck Surg. 2006 Feb;132(2):206-13.  Jared PRUITT et al. Upper-Airway Stimulation for Obstructive Sleep Apnea.  N Engl J Med. 2014 Jan 9;370(2):139-49.  Lorena Y et al. Increased Incidence of Cardiovascular Disease in Middle-aged Men with Obstructive Sleep Apnea. Am J Respir Crit Care Med; 2002 166: 159-165  Betancourt EM et al. Studying Life Effects and Effectiveness of Palatopharyngoplasty (SLEEP) study: Subjective Outcomes of Isolated Uvulopalatopharyngoplasty. Otolaryngol Head Neck Surg. 2011; 144: 623-631.        WHAT IF I ONLY HAVE SNORING?    Mandibular advancement devices, lateral sleep positioning, long-term weight loss and treatment of nasal allergies have been shown to improve snoring.  Exercising tongue muscles with a game (https://apps.Kuona/us/fidelia/[employer]-reduce-snoring/iz6693471616) or stimulating the tongue during the day with a device (https://doi.org/10.3390/spp21892477) have improved snoring in some individuals.  https://www.China-8.com/  https://www.sleepfoundation.org/best-anti-snoring-mouthpieces-and-mouthguards    Remember to Drive Safe... Drive Alive     Sleep health profoundly affects your health, mood, and your safety.  Thirty three percent of the population (one in three of us) is not getting enough sleep and many have a sleep disorder. Not getting enough sleep or having an untreated / undertreated sleep condition may make us sleepy without even knowing it. In fact, our  driving could be dramatically impaired due to our sleep health. As your provider, here are some things I would like you to know about driving:     Here are some warning signs for impairment and dangerous drowsy driving:              -Having been awake more than 16 hours               -Looking tired               -Eyelid drooping              -Head nodding (it could be too late at this point)              -Driving for more than 30 minutes     Some things you could do to make the driving safer if you are experiencing some drowsiness:              -Stop driving and rest              -Call for transportation              -Make sure your sleep disorder is adequately treated     Some things that have been shown NOT to work when experiencing drowsiness while driving:              -Turning on the radio              -Opening windows              -Eating any  distracting  /  entertaining  foods (e.g., sunflower seeds, candy, or any other)              -Talking on the phone      Your decision may not only impact your life, but also the life of others. Please, remember to drive safe for yourself and all of us.

## 2024-03-22 ENCOUNTER — VIRTUAL VISIT (OUTPATIENT)
Dept: SLEEP MEDICINE | Facility: CLINIC | Age: 44
End: 2024-03-22
Attending: NURSE PRACTITIONER
Payer: COMMERCIAL

## 2024-03-22 DIAGNOSIS — R29.818 SUSPECTED SLEEP APNEA: ICD-10-CM

## 2024-03-22 PROCEDURE — 95800 SLP STDY UNATTENDED: CPT | Performed by: INTERNAL MEDICINE

## 2024-03-27 NOTE — PROCEDURES
"WatchPAT - HOME SLEEP STUDY INTERPRETATION    Patient: Dat Yoder  MRN: [id number]  YOB: 1980  Study Date: 3/22/2024  Referring Provider: Orestes Barrera MD  Ordering Provider: Renee PATEL CNP    Chain of custody patient verification was not enabled.  Chain of custody verification was not present throughout the entire study.     Indications for Home Study: Dat Yoder is a 43 year old male who presents with symptoms suggestive of obstructive sleep apnea.    Estimated body mass index is 38.97 kg/m  as calculated from the following:    Height as of 2/2/24: 1.802 m (5' 10.95\").    Weight as of 2/2/24: 126.6 kg (279 lb).  Total score - [place]: 10 (2/2/2024  2:06 PM)  Total Score: 7 (2/2/2024  2:29 PM)    Data: A full night home sleep study was performed recording the standard physiologic parameters including peripheral arterial tonometry (PAT), sound/snoring, body position,  movement, sound, and oxygen saturation by pulse oximetry. Pulse rate was estimated by oximetry recording. Sleep staging (wake, REM, light, and deep sleep) was derived from PAT signal.  This study was considered adequate based on > 4 hours of quality oximetry and respiratory recording. As specified by the AASM Manual for the Scoring of Sleep and Associated events, version 2.3, Rule VIII.D 1B, 4% oxygen desaturation scoring for hypopneas is used as a standard of care on all home sleep apnea testing.    Total Recording Time: 6 hrs, 49 min  Total Sleep Time: 6 hrs, 30 min  % of Sleep Time REM: 19.6%    Respiratory:  Snoring: Snoring was present.  Respiratory events: The PAT respiratory disturbance index [pRDI] was 55.9 events per hour.  The PAT apnea/hypopnea index [pAHI] was 51.7 events per hour.  RUTH was 32.2 events per hour.  During REM sleep the pAHI was 51.  Sleep Associated Hypoxemia: sustained hypoxemia was not present. Mean oxygen saturation was 95%.  Minimum was 81%.  Time with saturation " less than 88% was 3.7 minutes.    Heart Rate: By pulse oximetry normal rate was noted.     Position: Percent of time spent: supine - 99.7%, prone - 0%, on right - 0.3%, on left - 0%.  pAHI was 51.7 per hour supine, N/A per hour prone, N/A per hour on right side, and N/A per hour on left side.     Assessment:   Severe obstructive sleep apnea.  Sleep associated hypoxemia was not present.    Recommendations:  Consider auto-CPAP at 5-20 cmH2O or polysomnography with full night PAP titration.  Suggest optimizing sleep hygiene and avoiding sleep deprivation.  Weight management.    Diagnosis Code(s): Obstructive Sleep Apnea G47.33, Hypoxemia G47.36    Edi Jimenez DO, March 27, 2024   Diplomate, American Board of Internal Medicine, Sleep Medicine

## 2024-03-27 NOTE — PROGRESS NOTES
Watch Pat has been scored using rule 1B, 4%.  Patient to follow up with provider to determine appropriate therapy.    PAT AHI: 51.7    Ordering Provider: AMANDA Bains CNP

## 2024-03-28 ENCOUNTER — VIRTUAL VISIT (OUTPATIENT)
Dept: ENDOCRINOLOGY | Facility: CLINIC | Age: 44
End: 2024-03-28
Payer: COMMERCIAL

## 2024-03-28 ENCOUNTER — TELEPHONE (OUTPATIENT)
Dept: ENDOCRINOLOGY | Facility: CLINIC | Age: 44
End: 2024-03-28

## 2024-03-28 VITALS — BODY MASS INDEX: 40.6 KG/M2 | HEIGHT: 71 IN | WEIGHT: 290 LBS

## 2024-03-28 DIAGNOSIS — E66.813 CLASS 3 SEVERE OBESITY WITH SERIOUS COMORBIDITY AND BODY MASS INDEX (BMI) OF 40.0 TO 44.9 IN ADULT, UNSPECIFIED OBESITY TYPE (H): Primary | ICD-10-CM

## 2024-03-28 DIAGNOSIS — R03.0 ELEVATED BP WITHOUT DIAGNOSIS OF HYPERTENSION: ICD-10-CM

## 2024-03-28 DIAGNOSIS — E66.01 CLASS 3 SEVERE OBESITY WITH SERIOUS COMORBIDITY AND BODY MASS INDEX (BMI) OF 40.0 TO 44.9 IN ADULT, UNSPECIFIED OBESITY TYPE (H): Primary | ICD-10-CM

## 2024-03-28 DIAGNOSIS — E66.01 MORBID OBESITY (H): ICD-10-CM

## 2024-03-28 PROCEDURE — 99205 OFFICE O/P NEW HI 60 MIN: CPT | Mod: 95

## 2024-03-28 RX ORDER — SEMAGLUTIDE 0.25 MG/.5ML
0.25 INJECTION, SOLUTION SUBCUTANEOUS WEEKLY
Qty: 2 ML | Refills: 0 | Status: SHIPPED | OUTPATIENT
Start: 2024-03-28 | End: 2024-05-08 | Stop reason: DRUGHIGH

## 2024-03-28 RX ORDER — SEMAGLUTIDE 0.5 MG/.5ML
0.5 INJECTION, SOLUTION SUBCUTANEOUS WEEKLY
Qty: 2 ML | Refills: 0 | Status: SHIPPED | OUTPATIENT
Start: 2024-03-28

## 2024-03-28 NOTE — TELEPHONE ENCOUNTER
PA Initiation    Medication: WEGOVY 0.25 MG/0.5ML SC SOAJ  Insurance Company: [employer]-Specialty PA's - Phone [phone] Fax [phone]  Pharmacy Filling the Rx:    Filling Pharmacy Phone:    Filling Pharmacy Fax:    Start Date: 3/28/2024      Key:[id number]

## 2024-03-28 NOTE — PATIENT INSTRUCTIONS
"Thank you for allowing us the privilege of caring for you. We hope we provided you with the excellent service you deserve.   Please let us know if there is anything else we can do for you so that we can be sure you are completely satisfied with your care experience.    To ensure the quality of our services you may be receiving a patient satisfaction survey from an independent patient satisfaction monitoring company.    The greatest compliment you can give is a \"Likely to Recommend\"    Your visit was with Mary Alvarez PA-C today.    Instructions per today's visit:     Delonte Yoder, it was great to visit with you today.  Here is a review of our visit.  If our clinic scheduler is not able to reach you please call [phone] to schedule your next appointments.    Plan  Start Wegovy 0.25mg once weekly for 4 weeks, then increase to 0.5mg once weekly.   Alternatives discussed: topiramate, phentermine (would need confirmation of blood pressure control if starting phentermine)  Goals we discussed today:   Work on reducing portion control   Keep up regular exercise as you've been doing   [facility] pharmacy in 6 weeks to check in on wegovy vs topiramate start   Follow up with Mary in 3 months   Dietician appointment tomorrow       Information about Video Visits with [employer]: video visit information  _________________________________________________________________________________________________________________________________________________________  If you are asked by your clinic team to have your blood pressure checked:  [facility] do offer several locations for blood pressure checks. Please follow the below link to schedule an appointment. Scheduling an appointment at the pharmacy for a blood pressure check is now preferred.    Appointment Plus " ([email or web address])  _________________________________________________________________________________________________________________________________________________________  Important contact and scheduling information:  Please call our contact center at [phone] to schedule your next appointments.  To find a lab location near you, please call [phone].  For any nursing questions or concerns call Caitlin Melendez LPN at [phone] or Brielle Phillips RN at [phone]  Please call during clinic hours Monday through Friday 8:00a - 4:00p if you have questions or you can contact us via [employer] at anytime and we will reply during clinic hours.    Lab results will be communicated through My Chart or letter (if My Chart not used). Please call the clinic if you have not received communication after 1 week or if you have any questions.?  Clinic Fax: [phone]    _________________________________________________________________________________________________________________________________________________________  Meal Replacement Products:    Here is the link to our new e-store where you can purchase our meal replacement products    [facility] E-Store  [email or web address]    The one week starter kit is a great way to sample a variety of products and see what works for you.    If you want more information about the product go to: [employer] Meals  [email or web address]    If you are an employee or [facility] or [facility] please contact your care team for a 10% estore discount    Free Shipping for orders over $75     Benefits of meal replacements products:    Portion and calorie control  Improved nutrition  Structured eating  Simplified food choices  Avoid contact with trigger foods  _________________________________________________________________________________________________________________________________________________________  Interested in working with a health  ?  Health coaches work with you to improve your overall health and wellbeing.  They look at the whole person, and may involve discussion of different areas of life, including, but not limited to the four pillars of health (sleep, exercise, nutrition, and stress management). Discuss with your care team if you would like to start working a health .  Health Coaching-3 Pack: Schedule by calling [phone]    $99 for three health coaching visits    Visits may be done in person or via phone    Coaching is a partnership between the  and the client; Coaches do not prescribe or diagnose    Coaching helps inspire the client to reach his/her personal goals   _________________________________________________________________________________________________________________________________________________________  24 Week Healthy Lifestyle Plan:    Our mission in the 24-week Healthy Lifestyle Plan is to provide you with individualized care by giving you the tools, education and support you need to lose weight and maintain a healthy lifestyle. In your 24-week journey, you ll be supported by a dedicated weight loss team that includes registered dietitians, medical weight management providers, health coaches, and nurses -- all with special expertise in weight loss -- to help you every step of the way.     Monthly meetings with your registered dietician or medical weight management provider help to review your progress, update your care plan, and make any adjustments needed to ensure success. Between these visits, weekly and bi-weekly health  visits will help you focus on the four pillars of weight loss -- stress, sleep, nutrition, and exercise -- and how you can best adapt each to achieve sustainable weight loss results.    In addition, you will be given exclusive access to online wellbeing classes through [employer].  Your initial visit will be with a medical weight management provider who will help to  understand your weight loss goals and ensure this program is the right fit for you. Please let our team know if you are interested in the 24 week plan by sending a message to your care team or calling [phone] to schedule.  _________________________________________________________________________________________________________________________________________________________  __________  [facility] Get Moving Program  Our team of physical therapists is trained to help you understand and take control of your condition. They will perform a thorough evaluation to determine your ability for activity and develop a customized plan to fit your goals and physical ability.  Scheduling: Unsure if the Get Moving program is right for you? Discuss the program with your medical provider or diabetes educator. You can also call us at [phone] to ask questions or schedule an appointment.   [facility] Get Moving Program  ____________________________________________________________________________________________________________________________________________________________________________  [facility] Diabetes Prevention Program (DPP)  If you have prediabetes and Medicare please contact us via [employer] to learn more about the Diabetes Prevention Program (DPP)  Program Details:   [facility] offers the year-long Diabetes Prevention Program (DPP). The program helps you to make lifestyle changes that prevent or delay type 2 diabetes by supporting healthy eating, increased physical activity, stress reduction and use of coping skills.   On average, previous [facility] DPP cohorts have lost and maintained at least 5% of their starting weight throughout the program and averaged more than 150 minutes of physical activity per week.  Participants meet weekly for one-hour group sessions over sixteen weeks, every other week for the next 8 weeks, and monthly for the last six months.   A year-long  maintenance program is also available for participants who complete the first year.   Location & Cost:   During the COVID-19 Public Health Emergency, the program is offered virtually. When in-person classes can resume, they will be held at [facility].  For people with Medicare, the program is covered in full. A self-pay option will also be available for those with non-Medicare insurance plans.   ______________________________________________________________________________________________________________________________________________________________________________________________________________________________    To work with a Behavioral Health Psychologist:    Call to schedule:    Renny Garcia - [phone]  Arianna Heller - [phone]  Vanessa Donovan - [phone]  Ondina Holland - [phone]   Harini Anderson PhD (cannot accept Medicare) [phone]        Thank you,   [facility] Comprehensive Weight Management Team

## 2024-03-28 NOTE — LETTER
"3/28/2024       RE: Dat Yoder  [address] [facility] [facility] [address]     Dear Colleague,    Thank you for referring your patient, Dat Yoder, to the [facility]. Please see a copy of my visit note below.    Virtual Visit Details    Type of service:  Video Visit     Originating Location (pt. Location): Home    Distant Location (provider location):  Off-site  Platform used for Video Visit: [email or web address]    67 minutes spent by me on the date of the encounter doing chart review, history and exam, documentation and further activities per the note    New Medical Weight Management Consult    PATIENT:  Dat Yoder  MRN:         [id number]  :         1980  SHELBY:         3/28/2024    Dear Dr. Diaz,    I had the pleasure of seeing your patient, Dat Yoder. Full intake/assessment was done to determine barriers to weight loss success and develop a treatment plan. Dat Yoder is a 43 year old male interested in treatment of medical problems associated with excess weight. He has a height of 5' 10.95\", a weight of 290 lbs 0 oz, and the calculated Body mass index is 40.5 kg/m .            Assessment & Plan  Problem List Items Addressed This Visit       Elevated BP without diagnosis of hypertension    Relevant Medications    Semaglutide-Weight Management (WEGOVY) 0.25 MG/0.5ML pen    Semaglutide-Weight Management (WEGOVY) 0.5 MG/0.5ML pen    Other Relevant Orders    Med Therapy Management Referral     Other Visit Diagnoses       Class 3 severe obesity with serious comorbidity and body mass index (BMI) of 40.0 to 44.9 in adult, unspecified obesity type (H)    -  Primary    Relevant Medications    Semaglutide-Weight Management (WEGOVY) 0.25 MG/0.5ML pen    Semaglutide-Weight Management (WEGOVY) 0.5 MG/0.5ML pen    Other Relevant Orders    Med Therapy Management Referral    Morbid obesity (H)        " "Relevant Medications    Semaglutide-Weight Management (WEGOVY) 0.25 MG/0.5ML pen    Semaglutide-Weight Management (WEGOVY) 0.5 MG/0.5ML pen           Overweight onset in childhood, has strong family history of obesity (both parents and all siblings). \"I've yo-yo'd my entire life.\"   Age 19 weighed approximately 210lbs, through college gained up to 315lbs. Did a weight loss challenge (hydroxycut and strict calorie intake) with friends in his 20s, lost down to 219 lbs, but then saw weight regain once stopping. Weight continued to yo yo between 290 and 250.     Did keto 8516-0737, was able to lose 290 to 255, but saw weight regain despite sticking to keto diet. Current weight is 290 lbs.        Past strategies to lose weight keto, noom, [place] diet, hydroxycut, calorie counting, intermittent fasting, over the last several years, but has not seen successful weight loss from this.       Past obstacles in weight loss: strong family history of genetics, difficulty finding a sustainable diet plan, travels a lot for work.       Comorbidities associated with weight gain include sleep apnea (confirmed recently by sleep study, getting set up with CPAP), elevated BP without diagnosis of HTN, depression (sees therapist once a month).       Motivators for weight loss include improve mood, reduce risk for health problems, be a good example for kids (daughter is currently dealing with weight issues as well).     He is interested in starting a medication as a tool for working towards sustainable weight loss.    Regarding eating patterns and diet, he typically eats 2-3 meals a day. Has a hectic schedule with work, travels a lot, sometimes skips lunch. Craves sweets, snacks on sweets maybe once a week. Is able to get full. Can stay full until next meal. Does not struggle with portion control, though sometimes orders extra food for his work.   Loves food, gets excited by different and interesting flavors.  Does not experience food " "noise. Does experience emotional eating (stress, celebration) . Does not experience a loss of control around eating.  Does snack out of boredom, but doesn't feel he deals with boredom generally (very busy with work and family).     Does a lot of eating out or takeout with clients for work, hates having a \"special\" diet when eating socially, really disliked this about keto.     Eats out/ gets take out 4-5 times a week (mostly for lunch through work). Drinks water, 1-2 diet sodas a day. Does not drink juice or regular pop. Pre-workout in the morning, seldom coffee. 1-2 beers 4 times a week (bush lite). Sometimes a cocktail socially.       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 Is on his feet for some of his work, other times is at a desk all day. Works out 5-6 times a week for an hour in the morning before work. Used to love running outdoors, will do this in the summer.         Past/ Current AOMs   None     Plan  Start Wegovy 0.25mg once weekly for 4 weeks, then increase to 0.5mg once weekly.   Alternatives discussed: topiramate, phentermine (would need confirmation of blood pressure control if starting phentermine)  Goals we discussed today:   Work on reducing portion control   Keep up regular exercise as you've been doing   [facility] pharmacy in 6 weeks to check in on wegovy vs topiramate start   Follow up with Mary in 3 months   Dietician appointment tomorrow         Anti-obesity medication started today for this patient   NED  Would likely see most benefit with portion reduction, help with cravings   No history of pancreatitis   No personal or family history of medullary thyroid carcinoma  No personal or family history of Multiple Endocrine Neoplasia Type 2  .     Potential anti-obesity medications for this patient the future if there are issues with cost or side effects  TOPIRAMATE  No history of kidney stones  No history of glaucoma   No issues " "with memory  No chronic kidney disease   Caution would be needed with this medication due to hx of depression, sees therapist regularly. Would monitor closely.   .    The following medications could be considered with caution for this patient   PHENTERMINE  No history of CVA   No history of cardiovascular disease   No history of cardiac arrhythmias   No history of glaucoma  No history of drug abuse  No history of hyperthyroidism  No concerns for agitation  Caution would be needed with this medication due to blood pressure not currently at goal, could consider with demonstration of improved blood pressure readings   .           Dat Yoder is a 43 year old male who presents to clinic today for the following health issues.     He has the following co-morbidities:        3/22/2024    11:39 AM   --   I have the following health issues associated with obesity Pre-Diabetes    High Blood Pressure   I have the following symptoms associated with obesity Knee Pain    Depression    Back Pain            No data to display                    3/22/2024    11:39 AM   Referring Provider   Please name the provider who referred you to Medical Weight Management  If you do not know, please answer \"I Don't Know\" My PCP             3/22/2024    11:39 AM   Weight History   How concerned are you about your weight? Very Concerned   I became overweight As a Child   The following factors have contributed to my weight gain Mental Health Issues    Eating Too Much    Genetic (Runs in the Family)    Stress   I have tried the following methods to lose weight Watching Portions or Calories    Exercise    Atkins-type Diet (Low Carb/High Protein)    Meal Replacements    Fasting   My lowest weight since age 19 was 219   My highest weight since age 19 was 325   The most weight I have ever lost was (lbs) 60   I have the following family history of obesity/being overweight My mother is overweight    My father is overweight    One or more of my " siblings are overweight   How has your weight changed over the last year? Gained           3/22/2024    11:39 AM   Diet Recall Review with Patient   If you do eat breakfast, what types of food do you eat? Eggs + veggies   If you do eat lunch, what types of food do you typically eat? Lots of [place]   If you do eat supper, what types of food do you typically eat? Cook at home almost always   How many glasses of juice do you drink in a typical day? 0   How many of glasses of milk do you drink in a typical day? 0   How many 8oz glasses of sugar containing drinks such as Reynold-Aid/sweet tea do you drink in a day? 0   How many cans/bottles of sugar pop/soda/tea/sports drinks do you drink in a day? 0   How many cans/bottles of diet pop/soda/tea or sports drink do you drink in a day? 2   How often do you have a drink of alcohol? 2-3 TImes a Week   If you do drink, how many drinks might you have in a day? 3-4           3/22/2024    11:39 AM   Eating Habits   Generally, my meals include foods like these bread, pasta, rice, potatoes, corn, crackers, sweet dessert, pop, or juice A Few Times a Week   Generally, my meals include foods like these fried meats, brats, burgers, french fries, pizza, cheese, chips, or ice cream Once a Week   Eat fast food (like McDonalds, Burger Nathaniel, Taco Bell) Never   Eat at a buffet or sit-down restaurant Less Than Weekly   Eat most of my meals in front of the TV or computer A Few Times a Week   Often skip meals, eat at random times, have no regular eating times A Few Times a Week   Rarely sit down for a meal but snack or graze throughout Once a Week   Eat extra snacks between meals A Few Times a Week   Eat most of my food at the end of the day Once a Week   Eat in the middle of the night or wake up at night to eat Never   Eat extra snacks to prevent or correct low blood sugar Never   Eat to prevent acid reflux or stomach pain Never   Worry about not having enough food to eat Never   I eat when I  am depressed A Few Times a Week   I eat when I am stressed A Few Times a Week   I eat when I am bored A Few Times a Week   I eat when I am anxious A Few Times a Week   I eat when I am happy or as a reward A Few Times a Week   I feel hungry all the time even if I just have eaten Less Than Weekly   Feeling full is important to me Never   I finish all the food on my plate even if I am already full Less Than Weekly   I can't resist eating delicious food or walk past the good food/smell Less Than Weekly   I eat/snack without noticing that I am eating Never   I eat when I am preparing the meal Once a Week   I eat more than usual when I see others eating Never   I have trouble not eating sweets, ice cream, cookies, or chips if they are around the house A Few Times a Week   I think about food all day Once a Week   What foods, if any, do you crave? Sweets/Candy/Chocolate           3/22/2024    11:39 AM   Amount of Food   I feel out of control when eating Never   I eat a large amount of food, like a loaf of bread, a box of cookies, a pint/quart of ice cream, all at once Never   I eat a large amount of food even when I am not hungry Never   I eat rapidly Weekly   I eat alone because I feel embarrassed and do not want others to see how much I have eaten Never   I eat until I am uncomfortably full Monthly   I feel bad, disgusted, or guilty after I overeat Never           3/22/2024    11:39 AM   Activity/Exercise History   How much of a typical 12 hour day do you spend sitting? Half the Day   How much of a typical 12 hour day do you spend lying down? Less Than Half the Day   How much of a typical day do you spend walking/standing? Half the Day   How many hours (not including work) do you spend on the TV/Video Games/Computer/Tablet/Phone? 2-3 Hours   How many times a week are you active for the purpose of exercise? 6-7 Times a Week   What keeps you from being more active? Lack of Time    Too tired   How many total minutes do you  "spend doing some activity for the purpose of exercising when you exercise? More Than 30 Minutes       PAST MEDICAL HISTORY:  Past Medical History:   Diagnosis Date    Priapism 2003    Resolved    Tobacco use disorder     Social smoker. Started smoking at 19 years and quite in 2012           3/22/2024    11:39 AM   Work/Social History Reviewed With Patient   My employment status is Full-Time   My job is    How much of your job is spent on the computer or phone? 75%   How many hours do you spend commuting to work daily? Under 1 hour   What is your marital status? /In a Relationship   If in a relationship, is your significant other overweight? No   If you have children, are they overweight? Yes   Who do you live with? Wife and two children   Who does the food shopping? I do       Marijuana use: cbd every day. Will take thc 3x a week, does not see increased appetite with this.   Alcohol use: 1-2 beers 4 times a week             3/22/2024    11:39 AM   Mental Health History Reviewed With Patient   Have you ever been physically or sexually abused? No   How often in the past 2 weeks have you felt little interest or pleasure in doing things? Not at all   Over the past 2 weeks how often have you felt down, depressed, or hopeless? For Several Days             3/22/2024    11:39 AM   Sleep History Reviewed With Patient   How many hours do you sleep at night? 6         MEDICATIONS:   Current Outpatient Medications   Medication Sig Dispense Refill    Semaglutide-Weight Management (WEGOVY) 0.25 MG/0.5ML pen Inject 0.25 mg Subcutaneous once a week For 4 weeks 2 mL 0    Semaglutide-Weight Management (WEGOVY) 0.5 MG/0.5ML pen Inject 0.5 mg Subcutaneous once a week After completing 4 weeks of 0.25mg dose 2 mL 0    Multiple Vitamins-Minerals (MULTIVITAMIN ADULT PO)              ALLERGIES:   No Known Allergies         No data to display                        Objective   Ht 1.802 m (5' 10.95\")   Wt 131.5 kg " (290 lb)   BMI 40.50 kg/m           Vitals:  No vitals were obtained today due to virtual visit.    Physical Exam   GENERAL: alert and no distress  EYES: Eyes grossly normal to inspection.  No discharge or erythema, or obvious scleral/conjunctival abnormalities.  RESP: No audible wheeze, cough, or visible cyanosis.    SKIN: Visible skin clear. No significant rash, abnormal pigmentation or lesions.  NEURO: Cranial nerves grossly intact.  Mentation and speech appropriate for age.  PSYCH: Appropriate affect, tone, and pace of words     Anti-obesity medication ROS:    HEENT  Hx of glaucoma: No    Cardiovascular  CAD:No  HTN: elevated blood pressure without diagnosis of HTN       Gastrointestinal  GERD:No  Constipation:No  Liver Dz:No  H/O Pancreatitis:No    Psychiatric  Bipolar: No  Anxiety:Yes  Depression:Yes  History of alcohol/drug abuse: No  Hx of eating disorder:No    Endocrine  Personal or family hx of MTC or MEN2:No  Diabetes/prediabetes: No    Neurologic:  Hx of seizures: No  Hx of migraines: No  Memory Impairment: No  CVA history: No      History of kidney stones: No  Kidney disease: No    Taking Opioid/Narcotic: No        Sincerely,    Mary Alvarez PA-C

## 2024-03-28 NOTE — NURSING NOTE
Is the patient currently in the state of [place]? YES    Visit mode:VIDEO    If the visit is dropped, the patient can be reconnected by: VIDEO VISIT: Send to e-mail at: [email or web address]    Will anyone else be joining the visit? NO  (If patient encounters technical issues they should call [phone]    How would you like to obtain your AVS? MyChart    Are changes needed to the allergy or medication list? No    Reason for visit: Consult    Carmen Dill VVF    Prev weight from 2/2 visit is inaccurate.

## 2024-03-28 NOTE — PROGRESS NOTES
"Virtual Visit Details    Type of service:  Video Visit     Originating Location (pt. Location): Home    Distant Location (provider location):  Off-site  Platform used for Video Visit: Roddy    67 minutes spent by me on the date of the encounter doing chart review, history and exam, documentation and further activities per the note    New Medical Weight Management Consult    PATIENT:  Dat Yoder  MRN:         [id number]  :         1980  SHELBY:         3/28/2024    Dear Dr. Diaz,    I had the pleasure of seeing your patient, Dat Yoder. Full intake/assessment was done to determine barriers to weight loss success and develop a treatment plan. Dat Yoder is a 43 year old male interested in treatment of medical problems associated with excess weight. He has a height of 5' 10.95\", a weight of 290 lbs 0 oz, and the calculated Body mass index is 40.5 kg/m .            Assessment & Plan   Problem List Items Addressed This Visit       Elevated BP without diagnosis of hypertension    Relevant Medications    Semaglutide-Weight Management (WEGOVY) 0.25 MG/0.5ML pen    Semaglutide-Weight Management (WEGOVY) 0.5 MG/0.5ML pen    Other Relevant Orders    Med Therapy Management Referral     Other Visit Diagnoses       Class 3 severe obesity with serious comorbidity and body mass index (BMI) of 40.0 to 44.9 in adult, unspecified obesity type (H)    -  Primary    Relevant Medications    Semaglutide-Weight Management (WEGOVY) 0.25 MG/0.5ML pen    Semaglutide-Weight Management (WEGOVY) 0.5 MG/0.5ML pen    Other Relevant Orders    Med Therapy Management Referral    Morbid obesity (H)        Relevant Medications    Semaglutide-Weight Management (WEGOVY) 0.25 MG/0.5ML pen    Semaglutide-Weight Management (WEGOVY) 0.5 MG/0.5ML pen           Overweight onset in childhood, has strong family history of obesity (both parents and all siblings). \"I've yo-yo'd my entire life.\"   Age 19 weighed approximately 210lbs, " "through college gained up to 315lbs. Did a weight loss challenge (hydroxycut and strict calorie intake) with friends in his 20s, lost down to 219 lbs, but then saw weight regain once stopping. Weight continued to yo yo between 290 and 250.     Did keto [id number], was able to lose 290 to 255, but saw weight regain despite sticking to keto diet. Current weight is 290 lbs.        Past strategies to lose weight keto, noom, [place] diet, hydroxycut, calorie counting, intermittent fasting, over the last several years, but has not seen successful weight loss from this.       Past obstacles in weight loss: strong family history of genetics, difficulty finding a sustainable diet plan, travels a lot for work.       Comorbidities associated with weight gain include sleep apnea (confirmed recently by sleep study, getting set up with CPAP), elevated BP without diagnosis of HTN, depression (sees therapist once a month).       Motivators for weight loss include improve mood, reduce risk for health problems, be a good example for kids (daughter is currently dealing with weight issues as well).     He is interested in starting a medication as a tool for working towards sustainable weight loss.    Regarding eating patterns and diet, he typically eats 2-3 meals a day. Has a hectic schedule with work, travels a lot, sometimes skips lunch. Craves sweets, snacks on sweets maybe once a week. Is able to get full. Can stay full until next meal. Does not struggle with portion control, though sometimes orders extra food for his work.   Loves food, gets excited by different and interesting flavors.  Does not experience food noise. Does experience emotional eating (stress, celebration) . Does not experience a loss of control around eating.  Does snack out of boredom, but doesn't feel he deals with boredom generally (very busy with work and family).     Does a lot of eating out or takeout with clients for work, hates having a \"special\" diet " when eating socially, really disliked this about keto.     Eats out/ gets take out 4-5 times a week (mostly for lunch through work). Drinks water, 1-2 diet sodas a day. Does not drink juice or regular pop. Pre-workout in the morning, seldom coffee. 1-2 beers 4 times a week (bush lite). Sometimes a cocktail socially.       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 Is on his feet for some of his work, other times is at a desk all day. Works out 5-6 times a week for an hour in the morning before work. Used to love running outdoors, will do this in the summer.         Past/ Current AOMs   None     Plan  Start Wegovy 0.25mg once weekly for 4 weeks, then increase to 0.5mg once weekly.   Alternatives discussed: topiramate, phentermine (would need confirmation of blood pressure control if starting phentermine)  Goals we discussed today:   Work on reducing portion control   Keep up regular exercise as you've been doing   [facility] pharmacy in 6 weeks to check in on wegovy vs topiramate start   Follow up with Mary in 3 months   Dietician appointment tomorrow         Anti-obesity medication started today for this patient   JULIOGOVY  Would likely see most benefit with portion reduction, help with cravings   No history of pancreatitis   No personal or family history of medullary thyroid carcinoma  No personal or family history of Multiple Endocrine Neoplasia Type 2  .     Potential anti-obesity medications for this patient the future if there are issues with cost or side effects  TOPIRAMATE  No history of kidney stones  No history of glaucoma   No issues with memory  No chronic kidney disease   Caution would be needed with this medication due to hx of depression, sees therapist regularly. Would monitor closely.   .    The following medications could be considered with caution for this patient   PHENTERMINE  No history of CVA   No history of cardiovascular disease   No  "history of cardiac arrhythmias   No history of glaucoma  No history of drug abuse  No history of hyperthyroidism  No concerns for agitation  Caution would be needed with this medication due to blood pressure not currently at goal, could consider with demonstration of improved blood pressure readings   .           Dat Yoder is a 43 year old male who presents to clinic today for the following health issues.     He has the following co-morbidities:        3/22/2024    11:39 AM   --   I have the following health issues associated with obesity Pre-Diabetes    High Blood Pressure   I have the following symptoms associated with obesity Knee Pain    Depression    Back Pain            No data to display                    3/22/2024    11:39 AM   Referring Provider   Please name the provider who referred you to Medical Weight Management  If you do not know, please answer \"I Don't Know\" My PCP             3/22/2024    11:39 AM   Weight History   How concerned are you about your weight? Very Concerned   I became overweight As a Child   The following factors have contributed to my weight gain Mental Health Issues    Eating Too Much    Genetic (Runs in the Family)    Stress   I have tried the following methods to lose weight Watching Portions or Calories    Exercise    Atkins-type Diet (Low Carb/High Protein)    Meal Replacements    Fasting   My lowest weight since age 19 was 219   My highest weight since age 19 was 325   The most weight I have ever lost was (lbs) 60   I have the following family history of obesity/being overweight My mother is overweight    My father is overweight    One or more of my siblings are overweight   How has your weight changed over the last year? Gained           3/22/2024    11:39 AM   Diet Recall Review with Patient   If you do eat breakfast, what types of food do you eat? Eggs + veggies   If you do eat lunch, what types of food do you typically eat? Lots of [place]   If you do eat supper, " what types of food do you typically eat? Cook at home almost always   How many glasses of juice do you drink in a typical day? 0   How many of glasses of milk do you drink in a typical day? 0   How many 8oz glasses of sugar containing drinks such as Reynold-Aid/sweet tea do you drink in a day? 0   How many cans/bottles of sugar pop/soda/tea/sports drinks do you drink in a day? 0   How many cans/bottles of diet pop/soda/tea or sports drink do you drink in a day? 2   How often do you have a drink of alcohol? 2-3 TImes a Week   If you do drink, how many drinks might you have in a day? 3-4           3/22/2024    11:39 AM   Eating Habits   Generally, my meals include foods like these bread, pasta, rice, potatoes, corn, crackers, sweet dessert, pop, or juice A Few Times a Week   Generally, my meals include foods like these fried meats, brats, burgers, french fries, pizza, cheese, chips, or ice cream Once a Week   Eat fast food (like McDonalds, Burger Nathaniel, Taco Bell) Never   Eat at a buffet or sit-down restaurant Less Than Weekly   Eat most of my meals in front of the TV or computer A Few Times a Week   Often skip meals, eat at random times, have no regular eating times A Few Times a Week   Rarely sit down for a meal but snack or graze throughout Once a Week   Eat extra snacks between meals A Few Times a Week   Eat most of my food at the end of the day Once a Week   Eat in the middle of the night or wake up at night to eat Never   Eat extra snacks to prevent or correct low blood sugar Never   Eat to prevent acid reflux or stomach pain Never   Worry about not having enough food to eat Never   I eat when I am depressed A Few Times a Week   I eat when I am stressed A Few Times a Week   I eat when I am bored A Few Times a Week   I eat when I am anxious A Few Times a Week   I eat when I am happy or as a reward A Few Times a Week   I feel hungry all the time even if I just have eaten Less Than Weekly   Feeling full is important to  me Never   I finish all the food on my plate even if I am already full Less Than Weekly   I can't resist eating delicious food or walk past the good food/smell Less Than Weekly   I eat/snack without noticing that I am eating Never   I eat when I am preparing the meal Once a Week   I eat more than usual when I see others eating Never   I have trouble not eating sweets, ice cream, cookies, or chips if they are around the house A Few Times a Week   I think about food all day Once a Week   What foods, if any, do you crave? Sweets/Candy/Chocolate           3/22/2024    11:39 AM   Amount of Food   I feel out of control when eating Never   I eat a large amount of food, like a loaf of bread, a box of cookies, a pint/quart of ice cream, all at once Never   I eat a large amount of food even when I am not hungry Never   I eat rapidly Weekly   I eat alone because I feel embarrassed and do not want others to see how much I have eaten Never   I eat until I am uncomfortably full Monthly   I feel bad, disgusted, or guilty after I overeat Never           3/22/2024    11:39 AM   Activity/Exercise History   How much of a typical 12 hour day do you spend sitting? Half the Day   How much of a typical 12 hour day do you spend lying down? Less Than Half the Day   How much of a typical day do you spend walking/standing? Half the Day   How many hours (not including work) do you spend on the TV/Video Games/Computer/Tablet/Phone? 2-3 Hours   How many times a week are you active for the purpose of exercise? 6-7 Times a Week   What keeps you from being more active? Lack of Time    Too tired   How many total minutes do you spend doing some activity for the purpose of exercising when you exercise? More Than 30 Minutes       PAST MEDICAL HISTORY:  Past Medical History:   Diagnosis Date    Priapism 2003    Resolved    Tobacco use disorder     Social smoker. Started smoking at 19 years and quite in 2012           3/22/2024    11:39 AM   Work/Social  "History Reviewed With Patient   My employment status is Full-Time   My job is    How much of your job is spent on the computer or phone? 75%   How many hours do you spend commuting to work daily? Under 1 hour   What is your marital status? /In a Relationship   If in a relationship, is your significant other overweight? No   If you have children, are they overweight? Yes   Who do you live with? Wife and two children   Who does the food shopping? I do       Marijuana use: cbd every day. Will take thc 3x a week, does not see increased appetite with this.   Alcohol use: 1-2 beers 4 times a week             3/22/2024    11:39 AM   Mental Health History Reviewed With Patient   Have you ever been physically or sexually abused? No   How often in the past 2 weeks have you felt little interest or pleasure in doing things? Not at all   Over the past 2 weeks how often have you felt down, depressed, or hopeless? For Several Days             3/22/2024    11:39 AM   Sleep History Reviewed With Patient   How many hours do you sleep at night? 6         MEDICATIONS:   Current Outpatient Medications   Medication Sig Dispense Refill    Semaglutide-Weight Management (WEGOVY) 0.25 MG/0.5ML pen Inject 0.25 mg Subcutaneous once a week For 4 weeks 2 mL 0    Semaglutide-Weight Management (WEGOVY) 0.5 MG/0.5ML pen Inject 0.5 mg Subcutaneous once a week After completing 4 weeks of 0.25mg dose 2 mL 0    Multiple Vitamins-Minerals (MULTIVITAMIN ADULT PO)              ALLERGIES:   No Known Allergies         No data to display                        Objective    Ht 1.802 m (5' 10.95\")   Wt 131.5 kg (290 lb)   BMI 40.50 kg/m           Vitals:  No vitals were obtained today due to virtual visit.    Physical Exam   GENERAL: alert and no distress  EYES: Eyes grossly normal to inspection.  No discharge or erythema, or obvious scleral/conjunctival abnormalities.  RESP: No audible wheeze, cough, or visible cyanosis.    SKIN: " Visible skin clear. No significant rash, abnormal pigmentation or lesions.  NEURO: Cranial nerves grossly intact.  Mentation and speech appropriate for age.  PSYCH: Appropriate affect, tone, and pace of words     Anti-obesity medication ROS:    HEENT  Hx of glaucoma: No    Cardiovascular  CAD:No  HTN: elevated blood pressure without diagnosis of HTN       Gastrointestinal  GERD:No  Constipation:No  Liver Dz:No  H/O Pancreatitis:No    Psychiatric  Bipolar: No  Anxiety:Yes  Depression:Yes  History of alcohol/drug abuse: No  Hx of eating disorder:No    Endocrine  Personal or family hx of MTC or MEN2:No  Diabetes/prediabetes: No    Neurologic:  Hx of seizures: No  Hx of migraines: No  Memory Impairment: No  CVA history: No      History of kidney stones: No  Kidney disease: No    Taking Opioid/Narcotic: No        Sincerely,    Mary Alvarez PA-C

## 2024-03-28 NOTE — TELEPHONE ENCOUNTER
Prior Authorization Approval    Medication: WEGOVY 0.25 MG/0.5ML SC SOAJ  Authorization Effective Date: 2/27/2024  Authorization Expiration Date: 10/24/2024  Approved Dose/Quantity: 2ml/28 days   Reference #:     Insurance Company: [employer] - Phone [phone] Fax [phone]  Expected CoPay: $ 100  CoPay Card Available:      Financial Assistance Needed: no  Which Pharmacy is filling the prescription:    Pharmacy Notified: no  Patient Notified: pharmacy will notify patient

## 2024-05-08 ENCOUNTER — VIRTUAL VISIT (OUTPATIENT)
Dept: CARDIOLOGY | Facility: CLINIC | Age: 44
End: 2024-05-08
Payer: COMMERCIAL

## 2024-05-08 VITALS — HEIGHT: 72 IN | BODY MASS INDEX: 39.01 KG/M2 | WEIGHT: 288 LBS

## 2024-05-08 DIAGNOSIS — E66.01 OBESITY, CLASS III, BMI 40-49.9 (MORBID OBESITY) (H): Primary | ICD-10-CM

## 2024-05-08 DIAGNOSIS — Z78.9 TAKES DIETARY SUPPLEMENTS: ICD-10-CM

## 2024-05-08 ASSESSMENT — PAIN SCALES - GENERAL: PAINLEVEL: NO PAIN (0)

## 2024-05-08 NOTE — PROGRESS NOTES
"Virtual Visit Details    Type of service:  Video Visit     Originating Location (pt. Location): {video visit patient location:142346::\"Home\"}  {PROVIDER LOCATION On-site should be selected for visits conducted from your clinic location or adjoining [facility] hospital, academic office, or other nearby [facility] building. Off-site should be selected for all other provider locations, including home:[id number]}  Distant Location (provider location):  {virtual location provider:821940}  Platform used for Video Visit: {Virtual Visit Platforms:733870::\"Siterra\"}  "

## 2024-05-08 NOTE — Clinical Note
Patient is doing great on Wegovy! We are planning a titration as tolerated to 2.4 mg and he will see you 7/2. Medication Therapy Management as you and they decide after that visit.  marvin

## 2024-05-08 NOTE — NURSING NOTE
Is the patient currently in the state of [place]? YES    Visit mode:VIDEO    If the visit is dropped, the patient can be reconnected by: VIDEO VISIT: Text to cell phone:   Telephone Information:   Mobile [phone]       Will anyone else be joining the visit? NO  (If patient encounters technical issues they should call [phone])    How would you like to obtain your AVS? MyChart    Are changes needed to the allergy or medication list? Pt stated no changes to allergies and Pt stated no med changes    Are refills needed on medications prescribed by this physician? NO    Reason for visit: Consult    Liat MCCORMACK

## 2024-05-08 NOTE — PATIENT INSTRUCTIONS
"Recommendations from MTM Pharmacist visit:                                                    MTM (medication therapy management) is a service provided by a clinical pharmacist designed to help you get the most of out of your medicines.  You may be sent a phone or email survey evaluating today's visit.  Please provide feedback you have for the service he received today if you are able.      Continue Wegovy titration: After completing 4 weeks of Wegovy 0.5 mg injection once weekly,  If tolerating, you may increase to 1 mg once weekly for 4 weeks. You may then increase to 1.7 mg after 4 weeks on 1mg and up to max dose 2.4 mg after 4 weeks on 1.7 mg. If you have any side effects (nausea, constipation, gas or bloating) you may stay on the same dose for an additional 4 weeks (or more).      If cost is prohibitive, you may try using Wegovy coupon (www.wegovy.com/coverage-and-savings)    To help with tolerability of Wegovy :  Eat small meals/snacks throughout the day (about every 2 hours)  Focus on getting protein in first with each meal and snack.   Drink plenty of water - goal 64 oz throughout the day  You may try Metamucil, Benefiber, or Citrucel to help feel more full (less nausea) and have softer, more consistent bowel movements.  To optimize weight management - work on incorporating resistance training/weight lifting to build muscle and improve overall metabolism of adipose tissue.      Follow-up: 7/2 with Mary Alvarez PA-C in about 5 months with Brianne Baltazar [facility] (as decided with Mary Alvarez PA-C in July).        It was great speaking with you today.  I value your experience and would be very thankful for your time in providing feedback in our clinic survey. In the next few days, you may receive an email or text message from [employer] with a link to a survey related to your  clinical pharmacist.\"     To schedule another MTM appointment, please call the clinic directly ([facility] " Clinic Phone Number: [phone] (schedules for [facility] and [facility] - providers, dietitians, health coaches) or you may call the [facility] scheduling line at [phone] or toll-free at [phone].     My Clinical Pharmacist's contact information:                                                      Please feel free to contact me with any questions or concerns you have.      Brianne Baltazar, Pharm D., MPH    Medication Therapy Management Pharmacist   [facility]

## 2024-05-08 NOTE — LETTER
5/8/2024      RE: Dat Yoder  [address] [facility] [address] [address]       Dear Colleague,    Thank you for the opportunity to participate in the care of your patient, Dat Yoder, at the [facility] [place] [facility]. Please see a copy of my visit note below.    Medication Therapy Management (MTM) Encounter    ASSESSMENT:                            Medication Adherence/Access: will send Wegovy to [facility] pharmacy to help with supply issues. Insurance alert notified [facility] pharmacy may be less expensive for 90 day supply - will notify patient. This may be more beneficial once on stable dose and supply issues resolve.    Obesity   Class III Obesity (BMI 40 or more): Patient would benefit from continuing pharmacotherapy for weight management. Given class III obesity, recommend optomizing GLP1/GIP therapy as data to support most significant weight loss and patient has no contraindications. Patient also demonstrating benefit from reduction in food noise and increased satiety. Given patient success and tolerability, seeing some waning efficacy over time, recommend continued titration of Wegovy. Education provided to maintain consistent dose during titration if experiencing intolerability.  Discussed dietary and behavioral modifications.       Supplements: stable     PLAN:                            Continue Wegovy titration: After completing 4 weeks of Wegovy 0.5 mg injection once weekly,  If tolerating, you may increase to 1 mg once weekly for 4 weeks. You may then increase to 1.7 mg after 4 weeks on 1mg and up to max dose 2.4 mg after 4 weeks on 1.7 mg. If you have any side effects (nausea, constipation, gas or bloating) you may stay on the same dose for an additional 4 weeks (or more).      If cost is prohibitive, you may try using Wegovy coupon (www.wegovy.com/coverage-and-savings)    To help with  tolerability of Wegovy :  Eat small meals/snacks throughout the day (about every 2 hours)  Focus on getting protein in first with each meal and snack.   Drink plenty of water - goal 64 oz throughout the day  You may try Metamucil, Benefiber, or Citrucel to help feel more full (less nausea) and have softer, more consistent bowel movements.  To optimize weight management - work on incorporating resistance training/weight lifting to build muscle and improve overall metabolism of adipose tissue.      Follow-up: 7/2 with Mary Alvarez PA-C in about 5 months with Brianne Baltazar [facility] (as decided with Mary Alvarez PA-C in July).    SUBJECTIVE/OBJECTIVE:                          Dat Yoder is a 43 year old male contacted via secure video for an initial visit. He was referred to me from Mary Alvarez PA-C.      Reason for visit: Medication Therapy Management - GLP1/GIP Management .    Allergies/ADRs: Reviewed in chart  Past Medical History: Reviewed in chart  Tobacco: He reports that he quit smoking about 12 years ago. His smoking use included cigarettes. He has never used smokeless tobacco.  Alcohol: 4-6 beverages / week  Caffeine: pre workout with caffeine most days, occasional diet coke or 1 other caffeinated shola in the day  Other Substance Use: CBD and THC for mental health; does not impact hunger/appetite/cravings  Personal Healthcare Goals: consistent weight management as has been working on weight management lifelong with weight loss and regain. Goal is more energy and sustainability with lifestyle management of weight and wellbeing.    Medication Adherence/Access: Medication barriers: obtaining medication from pharmacy - this was resolved with filling at [facility] pharmacy.     Obesity  Class III Obesity (BMI 40 or more):   Wegovy 0.5 mg once weekly x 2 weeks    Patient reports the following medication side effects: occasional gi upset. Overall patient very pleased with 10 lb weight  loss and feeling better relationship with food. Significant reduction in food noise and recognizing hunger cues more easily. Did feel that after 4 weeks on 0.25mg dose effects were waning and ready to go up to next dose. Would like to continue titration as does feel that there could be further improvements in efficacy.      Nutrition/Eating Habits: water intake is less than 64 oz, working to increase this.  Works to eat whole foods, low carb. Has to remember to eat some times and this is a change with Wegovy. Tries to not skip meals as he knows food is fuel.Loves food and wants to have a good relationship to food.    Exercise/Activity: has noticed more energy with working out more . Likes to run and is happy that weight loss may help make this improve. Knows resistance training is important, but has gotten frustrated in the past when muscle buliding leads to weight gain (acknowledges muscle weighs more than fat) so goal is to start weight lifting when starts to note weight loss in clothes and other ways first. Will continue cardio for now.    No history of pancreatitis  No personal or family history of MTC   No personal or family history of Multiple Endocrine Neoplasia Type 2  .   History:  diet: ast strategies to lose weight keto, noom, [place] diet, hydroxycut, calorie counting, intermittent fasting, over the last several years, but has not seen successful weight loss from this.       Wt Readings from Last 4 Encounters:   05/08/24 130.6 kg (288 lb)   03/28/24 131.5 kg (290 lb)   02/02/24 126.6 kg (279 lb)   01/18/24 134.3 kg (296 lb)     Estimated body mass index is 39.06 kg/m  as calculated from the following:    Height as of this encounter: 1.829 m (6').    Weight as of this encounter: 130.6 kg (288 lb).    Supplements:   Multivitamin once daily     No reported issues at this time.       Today's Vitals: Ht 1.829 m (6')   Wt 130.6 kg (288 lb)   BMI 39.06 kg/m    ----------------      I spent 22 minutes with  this patient today. All changes were made via collaborative practice agreement with Mary Alvarez A copy of the visit note was provided to the patient's provider(s).    A summary of these recommendations was sent via Visual.ly.    Brianne Baltazar, Pharm D., MPH    Medication Therapy Management Pharmacist   [facility]      Telemedicine Visit Details  Type of service:  Video Conference via G3  Start Time:  8:05 AM  End Time:  8:27 AM     Medication Therapy Recommendations  Obesity, Class III, BMI 40-49.9 (morbid obesity) (H)    Current Medication: Semaglutide-Weight Management (WEGOVY) 0.5 MG/0.5ML pen   Rationale: Dose too low - Dosage too low - Effectiveness   Recommendation: Increase Dose   Status: Accepted per CPA                Please do not hesitate to contact me if you have any questions/concerns.     Sincerely,     Brianne Baltazar, [facility]

## 2024-05-08 NOTE — PROGRESS NOTES
Medication Therapy Management (MTM) Encounter    ASSESSMENT:                            Medication Adherence/Access: will send Wegovy to [facility] pharmacy to help with supply issues. Insurance alert notified [facility] pharmacy may be less expensive for 90 day supply - will notify patient. This may be more beneficial once on stable dose and supply issues resolve.    Obesity   Class III Obesity (BMI 40 or more): Patient would benefit from continuing pharmacotherapy for weight management. Given class III obesity, recommend optomizing GLP1/GIP therapy as data to support most significant weight loss and patient has no contraindications. Patient also demonstrating benefit from reduction in food noise and increased satiety. Given patient success and tolerability, seeing some waning efficacy over time, recommend continued titration of Wegovy. Education provided to maintain consistent dose during titration if experiencing intolerability.  Discussed dietary and behavioral modifications.       Supplements: stable     PLAN:                            Continue Wegovy titration: After completing 4 weeks of Wegovy 0.5 mg injection once weekly,  If tolerating, you may increase to 1 mg once weekly for 4 weeks. You may then increase to 1.7 mg after 4 weeks on 1mg and up to max dose 2.4 mg after 4 weeks on 1.7 mg. If you have any side effects (nausea, constipation, gas or bloating) you may stay on the same dose for an additional 4 weeks (or more).      If cost is prohibitive, you may try using Wegovy coupon (www.wegovy.com/coverage-and-savings)    To help with tolerability of Wegovy :  Eat small meals/snacks throughout the day (about every 2 hours)  Focus on getting protein in first with each meal and snack.   Drink plenty of water - goal 64 oz throughout the day  You may try Metamucil, Benefiber, or Citrucel to help feel more full (less nausea) and have softer, more consistent bowel movements.  To  optimize weight management - work on incorporating resistance training/weight lifting to build muscle and improve overall metabolism of adipose tissue.      Follow-up: 7/2 with Mary Alvarez PA-C in about 5 months with Brianne Baltazar [facility] (as decided with Mary Alvarez PA-C in July).    SUBJECTIVE/OBJECTIVE:                          Dat Yoder is a 43 year old male contacted via secure video for an initial visit. He was referred to me from Mary Alvarez PA-C.      Reason for visit: Medication Therapy Management - GLP1/GIP Management .    Allergies/ADRs: Reviewed in chart  Past Medical History: Reviewed in chart  Tobacco: He reports that he quit smoking about 12 years ago. His smoking use included cigarettes. He has never used smokeless tobacco.  Alcohol: 4-6 beverages / week  Caffeine: pre workout with caffeine most days, occasional diet coke or 1 other caffeinated shloa in the day  Other Substance Use: CBD and THC for mental health; does not impact hunger/appetite/cravings  Personal Healthcare Goals: consistent weight management as has been working on weight management lifelong with weight loss and regain. Goal is more energy and sustainability with lifestyle management of weight and wellbeing.    Medication Adherence/Access: Medication barriers: obtaining medication from pharmacy - this was resolved with filling at [facility] pharmacy.     Obesity   Class III Obesity (BMI 40 or more):   Wegovy 0.5 mg once weekly x 2 weeks    Patient reports the following medication side effects: occasional gi upset. Overall patient very pleased with 10 lb weight loss and feeling better relationship with food. Significant reduction in food noise and recognizing hunger cues more easily. Did feel that after 4 weeks on 0.25mg dose effects were waning and ready to go up to next dose. Would like to continue titration as does feel that there could be further improvements in efficacy.      Nutrition/Eating  Habits: water intake is less than 64 oz, working to increase this.  Works to eat whole foods, low carb. Has to remember to eat some times and this is a change with Wegovy. Tries to not skip meals as he knows food is fuel.Loves food and wants to have a good relationship to food.    Exercise/Activity: has noticed more energy with working out more . Likes to run and is happy that weight loss may help make this improve. Knows resistance training is important, but has gotten frustrated in the past when muscle buliding leads to weight gain (acknowledges muscle weighs more than fat) so goal is to start weight lifting when starts to note weight loss in clothes and other ways first. Will continue cardio for now.    No history of pancreatitis  No personal or family history of MTC   No personal or family history of Multiple Endocrine Neoplasia Type 2  .   History:  diet: ast strategies to lose weight keto, noom, [place] diet, hydroxycut, calorie counting, intermittent fasting, over the last several years, but has not seen successful weight loss from this.       Wt Readings from Last 4 Encounters:   05/08/24 130.6 kg (288 lb)   03/28/24 131.5 kg (290 lb)   02/02/24 126.6 kg (279 lb)   01/18/24 134.3 kg (296 lb)     Estimated body mass index is 39.06 kg/m  as calculated from the following:    Height as of this encounter: 1.829 m (6').    Weight as of this encounter: 130.6 kg (288 lb).    Supplements:   Multivitamin once daily     No reported issues at this time.       Today's Vitals: Ht 1.829 m (6')   Wt 130.6 kg (288 lb)   BMI 39.06 kg/m    ----------------      I spent 22 minutes with this patient today. All changes were made via collaborative practice agreement with Mary Alvarez. A copy of the visit note was provided to the patient's provider(s).    A summary of these recommendations was sent via Fiteeza.    Brianne Baltazar, Pharm D., MPH    Medication Therapy Management Pharmacist   [facility]      Telemedicine Visit Details  Type of service:  Video Conference via Attentio  Start Time:  8:05 AM  End Time:  8:27 AM     Medication Therapy Recommendations  Obesity, Class III, BMI 40-49.9 (morbid obesity) (H)    Current Medication: Semaglutide-Weight Management (WEGOVY) 0.5 MG/0.5ML pen   Rationale: Dose too low - Dosage too low - Effectiveness   Recommendation: Increase Dose   Status: Accepted per CPA

## 2024-06-25 ENCOUNTER — MYC REFILL (OUTPATIENT)
Dept: CARDIOLOGY | Facility: CLINIC | Age: 44
End: 2024-06-25
Payer: COMMERCIAL

## 2024-06-25 DIAGNOSIS — E66.01 OBESITY, CLASS III, BMI 40-49.9 (MORBID OBESITY) (H): ICD-10-CM

## 2024-09-18 ENCOUNTER — TELEPHONE (OUTPATIENT)
Dept: PHARMACY | Facility: CLINIC | Age: 44
End: 2024-09-18
Payer: COMMERCIAL

## 2024-09-20 ENCOUNTER — TELEPHONE (OUTPATIENT)
Dept: PHARMACY | Facility: CLINIC | Age: 44
End: 2024-09-20
Payer: COMMERCIAL

## 2024-09-20 NOTE — TELEPHONE ENCOUNTER
Left Voicemail (2nd Attempt) and Sent Mychart (2nd Attempt) for the patient to call back and schedule the following:    Appointment type: ZULEMA MTM   Provider: Brianne Baltazar RP  Return date: next available  Specialty phone number: [phone]  Additional appointment(s) needed:   ZULEMA SEQUEIRA, Hafsa Alvarez PA-C, 3 mo follow-up after MTM appointment   Additonal Notes: n/a

## 2024-10-10 ENCOUNTER — TELEPHONE (OUTPATIENT)
Dept: ENDOCRINOLOGY | Facility: CLINIC | Age: 44
End: 2024-10-10
Payer: COMMERCIAL

## 2024-10-10 NOTE — TELEPHONE ENCOUNTER
Pt is due for a renewal for the Wegovy and show notes regardin a follow up.  Will need current weight

## 2024-10-11 ENCOUNTER — MYC REFILL (OUTPATIENT)
Dept: CARDIOLOGY | Facility: CLINIC | Age: 44
End: 2024-10-11
Payer: COMMERCIAL

## 2024-10-11 DIAGNOSIS — E66.01 OBESITY, CLASS III, BMI 40-49.9 (MORBID OBESITY) (H): ICD-10-CM

## 2024-10-16 NOTE — TELEPHONE ENCOUNTER
PA Initiation    Medication: WEGOVY 2.4 MG/0.75ML SC SOAJ  Insurance Company: [employer] Non-Specialty PA's - Phone [phone] Fax [phone]  Pharmacy Filling the Rx: [facility] [place], [place] - [address], [address]  Filling Pharmacy Phone:    Filling Pharmacy Fax:    Start Date: 10/16/2024

## 2024-10-16 NOTE — TELEPHONE ENCOUNTER
Prior Authorization Approval    Medication: WEGOVY 2.4 MG/0.75ML SC SOAJ  Authorization Effective Date: 9/16/2024  Authorization Expiration Date: 5/14/2025  Approved Dose/Quantity: 3ml per 28 days  Reference #: Key: [id number]   Insurance Company: [employer] Non-Specialty PA's - Phone [phone] Fax [phone]  Expected CoPay: $    CoPay Card Available:      Financial Assistance Needed: no  Which Pharmacy is filling the prescription: [facility] [place], [place] - [address], [address]  Pharmacy Notified: no  Patient Notified: no

## 2024-10-24 ENCOUNTER — TELEPHONE (OUTPATIENT)
Dept: ENDOCRINOLOGY | Facility: CLINIC | Age: 44
End: 2024-10-24
Payer: COMMERCIAL

## 2024-10-24 ENCOUNTER — TELEPHONE (OUTPATIENT)
Dept: PHARMACY | Facility: CLINIC | Age: 44
End: 2024-10-24
Payer: COMMERCIAL

## 2024-10-24 NOTE — TELEPHONE ENCOUNTER
Patient confirmed scheduled appointment:  Date: 1/8/25  Time: 8 am  Visit type: ZULEMA SEQUEIRA  Provider: Mary Alvarez  Location: virtual  Testing/imaging: n/a  Additional notes: n/a

## 2024-10-31 ENCOUNTER — VIRTUAL VISIT (OUTPATIENT)
Dept: PHARMACY | Facility: CLINIC | Age: 44
End: 2024-10-31
Payer: COMMERCIAL

## 2024-10-31 ENCOUNTER — TELEPHONE (OUTPATIENT)
Dept: ENDOCRINOLOGY | Facility: CLINIC | Age: 44
End: 2024-10-31
Payer: COMMERCIAL

## 2024-10-31 VITALS — BODY MASS INDEX: 36.84 KG/M2 | WEIGHT: 272 LBS | HEIGHT: 72 IN

## 2024-10-31 DIAGNOSIS — E66.812 OBESITY, CLASS II, BMI 35-39.9: Primary | ICD-10-CM

## 2024-10-31 RX ORDER — TIRZEPATIDE 10 MG/.5ML
10 INJECTION, SOLUTION SUBCUTANEOUS
Qty: 2 ML | Refills: 1 | Status: SHIPPED | OUTPATIENT
Start: 2024-10-31

## 2024-10-31 ASSESSMENT — PAIN SCALES - GENERAL: PAINLEVEL_OUTOF10: NO PAIN (0)

## 2024-10-31 NOTE — NURSING NOTE
Current patient location: work     Is the patient currently in the state of [place]? YES    Visit mode:VIDEO    If the visit is dropped, the patient can be reconnected by: VIDEO VISIT: Text to cell phone:   Telephone Information:   Mobile [phone]       Will anyone else be joining the visit? NO  (If patient encounters technical issues they should call [phone])    Are changes needed to the allergy or medication list? Yes Please remove meds no longer using.     Are refills needed on medications prescribed by this physician? NO    Rooming Documentation:  Not applicable      Reason for visit: Medication Therapy Management    Wt other than 24 hrs:    Pain more than one location:  no  Elida MCCORMACK

## 2024-10-31 NOTE — PATIENT INSTRUCTIONS
Recommendations from MTM Pharmacist visit:                                                    MTM (medication therapy management) is a service provided by a clinical pharmacist designed to help you get the most of out of your medicines.  You may be sent a phone or email survey evaluating today's visit.  Please provide feedback you have for the service he received today if you are able.      Finish your current supply of Wegovy 2.4 mg. Continue to focus on lifestyle modifications (see below) to optimize Wegovy and Zepbound for weight management.    When run out of current supply of Wegovy, on the following week, start Zepbound 7.5mg weekly. If you feel this dose is sufficient, stay on this dose until you see Mary Alvarez PA-C in Jan. If you are tolerating, and 7.5mg Zepbound does not seem to appropriately manage appetite and weight, you may increase to Zepbound 10 mg after 4 weeks on Zepbound 7.5mg.    To help with tolerability and effectiveness of Zepbound and Wegovy :  Eat small meals/snacks throughout the day (about every 2-4 hours)  Focus on getting protein in first with each meal and snack.   A good starting goal is 60 g protein daily (track this, especially if at weight loss plateau). Once you consistently are getting 60g daily, try getting 90 g protein daily.  Drink plenty of water - goal 64 oz throughout the day  You may try Metamucil, Benefiber, or Citrucel to help feel more full (less nausea) and have softer, more consistent bowel movements.  To optimize weight management - work on incorporating resistance training/weight lifting to build muscle and improve overall metabolism of adipose tissue.    Of note - I got an alert that if [facility] pharmacy has Zepbound in stock, it may be more cost effective to fill a 90 day supply with them. You may call them to look into this and I can send prescription to [facility] if you find it's better for you.    Follow-up: 1/8 with Mary  "SARAH Alvarez  Approx March/April with Brianne Baltazar, PharmD        It was great speaking with you today.  I value your experience and would be very thankful for your time in providing feedback in our clinic survey. In the next few days, you may receive an email or text message from [facility] the [facility] with a link to a survey related to your  clinical pharmacist.\"     To schedule another MT appointment, please call the clinic directly ([facility] Phone Number: [phone] (schedules for [facility] and [facility] - providers, dietitians, health coaches) or you may call the MTM scheduling line at [phone] or toll-free at [phone].     My Clinical Pharmacist's contact information:                                                      Please feel free to contact me with any questions or concerns you have.      Brianne Baltazar, Pharm D., MPH    Medication Therapy Management Pharmacist   [facility]          Meal Replacement Shake Options:   *Protein Shake Criteria: no more than 210 Calories, at least 20 grams of protein, and less than 10 grams of sugar   Premier Protein (160 Calories, 30 g protein)  Slim Fast Advanced Nutrition (180 Calories, 20 g protein)  Muscle Milk, lactose-free, 17 oz bottle (210 Calories, 30 g protein)  Integrated Supplements, no artificial sugars (110 Calories, 20 g protein)  Boost/Ensure Max (160 calories, 30 gm protein)   Fairlife Protein Shakes (160-230 calories, 26-42 gm protein)  Aldi's Elevation Protein Powder (180 calories, 30 gm protein)   Orgain Protein Shakes (130-160 calories, 20-26 gm protein)     Meal Replacement Bar Options:  Quest Protein Bars (190 Calories, 20 g protein)  Built Bar (170 Calories, 15-20 g protein)  One Protein Bar (210 calories, 20 g protein)  Orellana Signature Protein Bar ([employer]) (190 Calories, 21 g protein)  Pure Protein Bars (180 Calories, 21 g protein)    Low Calorie Frozen " Meal:  Healthy Choice Power Bowls  Lean Andieanuja  Smart Ones  Hermann Bonner      ---------------------------------------------------------------  Tips to Increase the Protein in Your Diet  You may need more protein in your diet to help you heal from an illness, surgery or wound. Extra protein can also help you gain weight. Here are some ideas for adding high-protein foods to your meals.  Meat and fish  Add chopped cooked meat to vegetables, salads, casseroles, soups, sauces and biscuit dough.  Use in omelets, soufflés, quiches, sandwich fillings and chicken or turkey stuffing.  Wrap in pie crust or biscuit dough to make a turnover.  Add to stuffed baked potatoes.  Make a dip with diced meat or flaked fish mixed with sour cream and spices.  Chopped, hard-cooked eggs  Add to salads.  Use for snacks and sandwich filling.  High-protein milk  To make high-protein milk, mix 1 quart whole milk with 1 cup powdered milk.  Add to cream soups, mashed potatoes, scrambled eggs, cereals and dried eggnog mix.  Use as an ingredient in puddings, custards, hot chocolate, milk shakes and pancakes.  Powdered milk  If you don't have any high-protein milk on hand, you can use powdered milk. Add 3 tablespoons to:  gravies, sauces, cream soups, mayonnaise  casseroles, meat patties, meatloaf, tuna salad, deviled ham  scalloped or mashed potatoes, creamed spinach  scrambled eggs, egg salad  cereals  yogurt, milk drinks, ice cream, frozen desserts, puddings, custards.  Add 4 to 6 tablespoons powdered milk to make:  cream sauces  breads, muffins, pancakes, waffles, cookies, cakes  cream pies, frostings, cake fillings  fruit cobblers, bread or rice pudding, gelatin desserts.  For high-protein eggnog, add 3 to 6 tablespoons powdered milk to prepared eggnog.  Hard or soft cheese  Melt on sandwiches, breads, tortillas, hamburgers, hot dogs, other meats, vegetables, eggs and pies.  Grate into soups, chili, sauces, casseroles, vegetables,  potatoes, rice, noodles or meatloaf.  Eat with toast or crackers, or melt for ashley dip.  Cottage cheese or ricotta cheese  Mix with or scoop on top of fruits and vegetables.  Add to casseroles, lasagna, spaghetti, noodles and egg dishes (omelets, scrambled eggs, soufflés).  Use in gelatin, pudding-type desserts, cheesecake and pancake batter.  Use to stuff crepes, pasta shells or manicotti.  Fruit yogurt  Blend with fruits for a fruit smoothie.  Use as a dip for fruits and vegetables.  Scoop on top of pancakes or waffles.  Tofu  Blend silken tofu with fruits and juices for a smoothie.  Add chunks of firm tofu to soups and stews, or crumble into meatloaf.  Blend dried onion soup mix into soft or silken tofu for dip.  Use pureed silken tofu for part of the mayonnaise, sour cream, cream cheese or ricotta cheese called for in recipes.  Beans  Use cooked beans or peas in soups, casseroles, pasta, tacos and burritos.  Nuts and seeds  Note: For children under 3, discuss with the child's care team.  Use in casseroles, breads, muffins, pancakes, cookies and waffles.  Sprinkle on fruits, cereals, ice cream, yogurt, vegetables and salads.  Mix with raisins, dried fruits and chocolate chips for a snack.  Nut butters  Note: For children under 3, discuss with the child's care team.  Spread on sandwiches, toast, muffins, crackers, waffles, pancakes and fruit slices.  Use as a dip for raw vegetables.  Blend with milk drinks, or swirl through ice cream, yogurt or hot cereal.  Nutrition supplements (nutrition bars, drinks and powders)  Add powders to milk drinks and desserts.  Mix with ice cream, milk and fruit for a high-protein milk shake.    For informational purposes only. Not to replace the advice of your health care provider. Clinically reviewed by Clarissa Tello, DANYELL, LD, and the Clinical Nutrition Service Line. Copyright   2005 [facility]. All rights reserved. [employer] 521720 - REV  "04/24.      -----------------------------------------------------------------------------------------------------------------  Learning About High-Protein Foods  What foods are high in protein?     The foods you eat contain nutrients, such as vitamins and minerals. Protein is a nutrient. Your body needs the right amount to stay healthy and work as it should. You can use the list below to help you make choices about which foods to eat.  Here are some examples of foods that are high in protein.  Dairy and dairy alternatives  Cheese  Milk  Soy milk  Yogurt (especially Greek)  Meat  Beef  Chicken  Ham  Lamb  Lunch meat  Pork  Sausage  Turkey  Other protein foods  Beans (black, garbanzo, kidney, lima)  Eggs  Hummus  Lentils  Nuts  Peanut butter and other nut butters  Peas  Soybeans  Tofu  Veggie or soy leyda (Check the nutrition label for the amount of protein in each serving.)  Seafood  Anchovies  Cod  Crab  Halibut  Mattaponi  Sardines  Shrimp  [place]  [place]  Tuna  Protein supplements  Bars (Check the nutrition label for the amount of protein in each serving.)  Drinks  Powders  Work with your doctor to find out how much of this nutrient you need. Depending on your health, you may need more or less of it in your diet.  Where can you learn more?  Go to https://[email or web address]/patiented  Enter P335 in the search box to learn more about \"Learning About High-Protein Foods.\"  Current as of: September 20, 2023               Content Version: 14.0    3981-1084 [employer].   Care instructions adapted under license by your healthcare professional. If you have questions about a medical condition or this instruction, always ask your healthcare professional. [employer] disclaims any warranty or liability for your use of this information.         "

## 2024-10-31 NOTE — PROGRESS NOTES
Medication Therapy Management (MTM) Encounter    ASSESSMENT:                            Medication Adherence/Access: No issues identified.    Weight management :   Patient would benefit from continuing pharmacotherapy for weight management. Given tolerating well, class II obesity, recommend optimizing GLP1/GIP therapy as data to support most significant weight loss and patient has no contraindications. Patient also realizing benefit from reduction in food noise and increased satiety. Education provided on continued dietary and behavioral modifications. Given has optimized Wegovy, recommend Zepbound given more potent for weight loss.      Approximate Comparative doses of GLP1s and GLP1/GIPs (DM=Diabetes indication, WL=Weight loss indication)     Liraglutide   (Victoza- DM)   (Saxenda-WL) Daily 0.6mg 1.2mg 1.8mg  2.4   mg 3 mg             Exenatide   (Bydreon-DM) Weekly     2mg                 Dulaglutide   (Trulicity-DM) Weekly   0.75mg 1.5mg   3 mg  4.5 mg          Oral Semaglutide  (Rybelsus -DM) Daily 3mg 7mg 14mg                 Semaglutide  (Ozempic- DM) Weekly   0.25mg 0.5mg    1mg   2mg        Semaglutide  (Compounded) Weekly  0.25mg 0.5mg   1mg 1.5mg 2mg 2.5mg     Semaglutide   (Wegovy-WL) Weekly   0.25mg 0.5mg    1mg 1.7mg  2.4mg       Tirzeptatide  (Mounjaro-DM)  (Zepbound-WL) Weekly     2.5mg       5 mg 7.5mg 10 mg 12.5 15 mg       Recommend when transitioning from one GLP-1 agonist to another, to consider step down by 1 dose when transitioning to decrease risk of gastrointestinal side effects.       PLAN:                            Finish your current supply of Wegovy 2.4 mg. Continue to focus on lifestyle modifications (see below) to optimize Wegovy and Zepbound for weight management.    When run out of current supply of Wegovy, on the following week, start Zepbound 7.5mg weekly. If you feel this dose is sufficient, stay on this dose until you see Mary Alvarez PA-C in Jan. If you are tolerating, and  7.5mg Zepbound does not seem to appropriately manage appetite and weight, you may increase to Zepbound 10 mg after 4 weeks on Zepbound 7.5mg.    To help with tolerability and effectiveness of Zepbound and Wegovy :  Eat small meals/snacks throughout the day (about every 2-4 hours)  Focus on getting protein in first with each meal and snack.   A good starting goal is 60 g protein daily (track this, especially if at weight loss plateau). Once you consistently are getting 60g daily, try getting 90 g protein daily.  Drink plenty of water - goal 64 oz throughout the day  You may try Metamucil, Benefiber, or Citrucel to help feel more full (less nausea) and have softer, more consistent bowel movements.  To optimize weight management - work on incorporating resistance training/weight lifting to build muscle and improve overall metabolism of adipose tissue.    Of note - I got an alert that if [facility] pharmacy has Zepbound in stock, it may be more cost effective to fill a 90 day supply with them. You may call them to look into this and I can send prescription to [facility] if you find it's better for you.    Follow-up: 1/8 with Mary Alvarez PA-C  Approx March/April with Brianne Baltazar PharmD      SUBJECTIVE/OBJECTIVE:                          Dat Yoder is a 44 year old male seen for a follow-up visit.       Reason for visit: Medication Therapy Management - GLP1/GIP Agonist Management.    Allergies/ADRs: Reviewed in chart  Past Medical History: Reviewed in chart  Tobacco: He reports that he quit smoking about 12 years ago. His smoking use included cigarettes. He has never used smokeless tobacco.  Alcohol: not currently using  Caffeine: pre workout with caffeine most days, occasional diet coke or 1 other caffeinated shola in the day  Other Substance Use: CBD and THC for mental health; does not impact hunger/appetite/cravings  Personal Healthcare Goals: consistent weight management as has  been working on weight management lifelong with weight loss and regain. Goal is more energy and sustainability with lifestyle management of weight and wellbeing.    Medication Adherence/Access: Medication barriers: obtaining medication from pharmacy - this was resolved with filling at [facility] pharmacy.     Weight Management   Class III Obesity (BMI 40 or more):   Wegovy 2.4 mg once weekly x 3 months    Patient denies side effects. Significant reduction in food noise and recognizing hunger cues more easily. Frustrated not realizing more weight loss despite being on max dose Wegovy.    No taste/cravign for beer any more -down from 4-6 beverages / week, most nights after work.    Nutrition/Eating Habits: goal and meets most days is 1 gal water daily  Works to eat whole foods, low carb. . Tries to not skip meals as he knows food is fuel.Loves food and wants to have a good relationship to food.    Exercise/Activity: recently started going to gym several days per week with friend to lift weights.    No history of pancreatitis  No personal or family history of MTC   No personal or family history of Multiple Endocrine Neoplasia Type 2  .   History:  diet: ast strategies to lose weight keto, noom, [place] diet, hydroxycut, calorie counting, intermittent fasting, over the last several years, but has not seen successful weight loss from this.    \      Initial consult weight: 288 lb       Current weight today: 272 lbs 0 oz  Cumulative Weight Loss: -16 lb, -5.6 % from baseline    Wt Readings from Last 4 Encounters:   10/31/24 272 lb (123.4 kg)   05/08/24 288 lb (130.6 kg)   03/28/24 290 lb (131.5 kg)   02/02/24 279 lb (126.6 kg)     Estimated body mass index is 36.89 kg/m  as calculated from the following:    Height as of this encounter: 6' (1.829 m).    Weight as of this encounter: 272 lb (123.4 kg).        Today's Vitals: Ht 6' (1.829 m)   Wt 272 lb (123.4 kg)   BMI 36.89 kg/m    ----------------      I  spent 12 minutes with this patient today. All changes were made via collaborative practice agreement with Mary Alvarez PA-C . A copy of the visit note was provided to the patient's provider(s).    A summary of these recommendations was sent via [employer].    Brianne Baltazar, Pharm D., MPH    Medication Therapy Management Pharmacist   [facility]      Telemedicine Visit Details  The patient's medications can be safely assessed via a telemedicine encounter.  Type of service:  Video Conference via [employer]  Originating Location (pt. Location): Home    Distant Location (provider location):  Off-site  Start Time:  2:33 PM  End Time:  2:45 PM     Medication Therapy Recommendations  Obesity, Class II, BMI 35-39.9   1 Current Medication: Semaglutide-Weight Management (WEGOVY) 2.4 MG/0.75ML pen   Current Medication Sig: Inject 2.4 mg subcutaneously once a week. after 4 weeks on 1.7 mg dose   Rationale: More effective medication available - Ineffective medication - Effectiveness   Recommendation: Change Medication   Status: Accepted per CPA   Identified Date: 10/31/2024 Completed Date: 10/31/2024

## 2024-10-31 NOTE — TELEPHONE ENCOUNTER
Prior Authorization Approval    Medication: ZEPBOUND 7.5 MG/0.5ML SC SOAJ  Authorization Effective Date: 10/1/2024  Authorization Expiration Date: 6/28/2025  Approved Dose/Quantity:    Reference #: Key: [id number]   Insurance Company: [employer] Non-Specialty PA's - Phone [phone] Fax [phone]  Expected CoPay: $100.00   CoPay Card Available:      Financial Assistance Needed:    Which Pharmacy is filling the prescription: [facility] [address], [address]    Key: [id number]

## 2024-10-31 NOTE — Clinical Note
Patient doing well with lifestyle modifications, less cravings/food noise with Wegovy, but feels hit weight loss plateayu - [id number] to Zepbound today.  Seeing you in January. marvin

## 2024-12-19 ENCOUNTER — PATIENT OUTREACH (OUTPATIENT)
Dept: CARE COORDINATION | Facility: CLINIC | Age: 44
End: 2024-12-19
Payer: COMMERCIAL

## 2024-12-26 ENCOUNTER — TELEPHONE (OUTPATIENT)
Dept: ENDOCRINOLOGY | Facility: CLINIC | Age: 44
End: 2024-12-26
Payer: COMMERCIAL

## 2024-12-26 NOTE — TELEPHONE ENCOUNTER
Prior Authorization Retail Medication Request    Medication/Dose: zepbound  Diagnosis and ICD code (if different than what is on RX):    New/renewal/insurance change PA/secondary ins. PA:  Previously Tried and Failed:    Rationale:      Insurance   Primary: [employer]  Insurance ID:  [id number]    Secondary (if applicable):  Insurance ID:      Pharmacy Information (if different than what is on RX)  Name:  [facility]  Phone:  [phone]  Fax:[phone]    Clinic Information  Preferred routing pool for dept communication:

## 2024-12-27 NOTE — TELEPHONE ENCOUNTER
Retail Pharmacy Prior Authorization Team   Phone: [phone]      Received additional question set for quantity limit request.    Answered and faxed back to FRANCOISE via RightFax    Outgoing fax:

## 2024-12-27 NOTE — TELEPHONE ENCOUNTER
Retail Pharmacy Prior Authorization Team   Phone: [phone]        PA Initiation    Medication: ZEPBOUND 7.5 MG/0.5ML SC SOAJ  Insurance Company: [employer] Non-Specialty PA's - Phone [phone] Fax [phone]  Pharmacy Filling the Rx: [place], [place] - [address], [address]  Filling Pharmacy Phone: [phone]  Filling Pharmacy Fax:    Start Date: 12/27/2024    I filled out the manual FRANCOISE form for quantity limit request for the Zepbound 7.5 mg/0.5ml.    Faxed via RightFax:

## 2024-12-30 NOTE — TELEPHONE ENCOUNTER
Mycrocaelt sent to patient for next steps, potential to increase to Zepbound 10 mg weekly. Will await to hear from patient. Tried calling patient, no answer. Left VM and CB#.     Lauren Bloch, PharmD, BCACP   Medication Therapy Management Pharmacist   [facility]

## 2025-01-02 ENCOUNTER — PATIENT OUTREACH (OUTPATIENT)
Dept: CARE COORDINATION | Facility: CLINIC | Age: 45
End: 2025-01-02
Payer: COMMERCIAL

## 2025-01-07 NOTE — PROGRESS NOTES
"  Return Medical Weight Management Note     Dat Yoder  MRN:  [id number]  :  1980  SHELBY:  2025    Dear Orestes Barrera MD, MD,    I had the pleasure of seeing your patient Dat Yoder. He is a 44 year old male who I am continuing to see for treatment of obesity related to:        3/22/2024    11:39 AM   --   I have the following health issues associated with obesity Pre-Diabetes    High Blood Pressure   I have the following symptoms associated with obesity Knee Pain    Depression    Back Pain       Assessment & Plan   Problem List Items Addressed This Visit    None  Visit Diagnoses       Class 3 severe obesity with serious comorbidity and body mass index (BMI) of 40.0 to 44.9 in adult, unspecified obesity type (H)    -  Primary    Relevant Orders    Comprehensive metabolic panel    Vitamin D Deficiency    Parathyroid Hormone Intact           Plan  Continue zepbound 10mg - can reach out via [employer] if wanting to increase before next appointment   Ensure you are hitting protein and hydration goals on day after injection to help with managing mood, contact clinic if seeing worsened mood with this medication   Goals we discussed today:   Keep up excellent changes   Labs ordered today: pth, cmp, vitamin d   Follow up with Brianne Baltazar 3/21/25   Follow up with Mary Alvarez in 6 months   Dietician appointment as needed   Keep up the excellent work!       INTERVAL HISTORY:  New MWM with me 3/28/24   Overweight onset in childhood, has strong family history of obesity (both parents and all siblings). \"I've yo-yo'd my entire life.\"   Age 19 weighed approximately 210lbs, through college gained up to 315lbs. Did a weight loss challenge (hydroxycut and strict calorie intake) with friends in his 20s, lost down to 219 lbs, but then saw weight regain once stopping. Weight continued to yo yo between 290 and 250.   Did keto [id number], was able to lose 290 to 255, but saw weight regain despite sticking to " "keto diet. Current weight is 290 lbs.    Past strategies to lose weight keto, noom, [place] diet, hydroxycut, calorie counting, intermittent fasting, over the last several years, but has not seen successful weight loss from this.     Past obstacles in weight loss: strong family history of genetics, difficulty finding a sustainable diet plan, travels a lot for work.   Comorbidities associated with weight gain include sleep apnea (confirmed recently by sleep study, getting set up with CPAP), elevated BP without diagnosis of HTN, depression (sees therapist once a month).      Motivators for weight loss include improve mood, reduce risk for health problems, be a good example for kids (daughter is currently dealing with weight issues as well).   He is interested in starting a medication as a tool for working towards sustainable weight loss.     Regarding eating patterns and diet, he typically eats 2-3 meals a day. Has a hectic schedule with work, travels a lot, sometimes skips lunch. Craves sweets, snacks on sweets maybe once a week. Is able to get full. Can stay full until next meal. Does not struggle with portion control, though sometimes orders extra food for his work.   Loves food, gets excited by different and interesting flavors.  Does not experience food noise. Does experience emotional eating (stress, celebration) . Does not experience a loss of control around eating.  Does snack out of boredom, but doesn't feel he deals with boredom generally (very busy with work and family).   Does a lot of eating out or takeout with clients for work, hates having a \"special\" diet when eating socially, really disliked this about keto.   Eats out/ gets take out 4-5 times a week (mostly for lunch through work). Drinks water, 1-2 diet sodas a day. Does not drink juice or regular pop. Pre-workout in the morning, seldom coffee. 1-2 beers 4 times a week (bush lite). Sometimes a cocktail socially.     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 Is on his feet for some of his work, other times is at a desk all day. Works out 5-6 times a week for an hour in the morning before work. Used to love running outdoors, will do this in the summer.   -started wegovy   -switched to zepbound for improved weight loss through Pharm D Brianne Munroe   -increased to zepbound 10mg     Today in visit 1/8/25  On zepbound 10mg currently- feeling good on this     Wt Readings from Last 5 Encounters:   01/08/25 124.7 kg (275 lb)   10/31/24 123.4 kg (272 lb)   05/08/24 130.6 kg (288 lb)   03/28/24 131.5 kg (290 lb)   02/02/24 126.6 kg (279 lb)       Anti-obesity medication history    Current:   Zepbound 10mg- 2 weeks into current dose.  notes he's more muhammad for the first 24 hours after a dose, but feels this is overall tolerable. Denies SI related to this. Less GI discomfort with zepbound compared to wegovy- notices bms are more regular. Denies GERD symptoms. Denies nausea or vomiting.     Past/Failed/contraindicated:       Recent diet changes: Notes he sometimes has to remind himself to eat, which he feels to be positive. Describes a \"pleasant disinterest\" in food. Also reduced cravings in alcohol, which has helped him reduce alcohol consumption.   Some increased sugar consumption since the holidays.   Quick meal recall  B: protein - eggs or steak made with eggs. Drinks water. Pre-workout in the morning.   L: Might skip if busy with work. diet coke, kennedy or greek salad or [place] food.  D: Cooks 95% of the time, lots of stir dolan (protein + veggies+ olive oil+ sauce). Sometimes has zoodles. Sometimes has cauliflower pizza.   Typically doesn't eat after dinner- sometimes might have a bit of frozen yogurt- but feels sweets are slippery slope for him.     Recent exercise/activity changes: in the middle of a 75 day streak of exercising- though otherwise averaging 5x a week with exercising in the morning.  " incorporating lifting into his workout schedule which he's loving. Training for a 10 mile run in April.     Recent sleep changes: notes quality of sleep is better- started a cpap 5 months ago.     Vitamins/Labs: Pth, vitamin d, cmp ordered today. Currently taking a multivitamin daily, also will have a prework out drink that has vitamins in it.       CURRENT WEIGHT:   275 lbs 0 oz    Initial Weight (lbs): 290 lbs  Last Visits Weight: 131.5 kg (290 lb)  Cumulative weight loss (lbs): 15  Weight Loss Percentage: 5.17%        1/5/2025    12:57 PM   Changes and Difficulties   I have made the following changes to my diet since my last visit: Eating significantly less   With regards to my diet, I am still struggling with: Sugar   I have made the following changes to my activity/exercise since my last visit: Started lifting with a partner 3-5 times/week. Starting 75 day challenge working out everydat.             MEDICATIONS:   Current Outpatient Medications   Medication Sig Dispense Refill    Multiple Vitamins-Minerals (MULTIVITAMIN ADULT PO)       Semaglutide-Weight Management (WEGOVY) 2.4 MG/0.75ML pen Inject 2.4 mg subcutaneously once a week. after 4 weeks on 1.7 mg dose 3 mL 1    tirzepatide-Weight Management (ZEPBOUND) 7.5 MG/0.5ML prefilled pen Inject 0.5 mLs (7.5 mg) subcutaneously every 7 days. Take week after last Wegovy 2.4mg dose 2 mL 2    ZEPBOUND 10 MG/0.5ML prefilled pen Inject 0.5 mLs (10 mg) subcutaneously every 7 days. Start after 4 weeks of 7.5 mg Zepbound, if tolerating 2 mL 1           1/5/2025    12:57 PM   Weight Loss Medication History Reviewed With Patient   Are you having any side effects from the weight loss medication that we have prescribed you? Yes   If you are having side effects please describe: Mood                No data to display                  PHYSICAL EXAM:  Objective    Ht 1.829 m (6')   Wt 124.7 kg (275 lb)   BMI 37.30 kg/m      Vitals - Patient Reported  Pain Score: No Pain  (0)      Vitals:  No vitals were obtained today due to virtual visit.    GENERAL: alert and no distress  EYES: Eyes grossly normal to inspection.  No discharge or erythema, or obvious scleral/conjunctival abnormalities.  RESP: No audible wheeze, cough, or visible cyanosis.    SKIN: Visible skin clear. No significant rash, abnormal pigmentation or lesions.  NEURO: Cranial nerves grossly intact.  Mentation and speech appropriate for age.  PSYCH: Appropriate affect, tone, and pace of words        Sincerely,    Mary Alvarez PA-C      23 minutes spent by me on the date of the encounter doing chart review, history and exam, documentation and further activities per the note    The longitudinal plan of care for the diagnosis(es)/condition(s) as documented were addressed during this visit. Due to the added complexity in care, I will continue to support Dat in the subsequent management and with ongoing continuity of care.

## 2025-01-08 ENCOUNTER — VIRTUAL VISIT (OUTPATIENT)
Dept: ENDOCRINOLOGY | Facility: CLINIC | Age: 45
End: 2025-01-08
Payer: COMMERCIAL

## 2025-01-08 VITALS — WEIGHT: 275 LBS | HEIGHT: 72 IN | BODY MASS INDEX: 37.25 KG/M2

## 2025-01-08 DIAGNOSIS — E66.01 CLASS 3 SEVERE OBESITY WITH SERIOUS COMORBIDITY AND BODY MASS INDEX (BMI) OF 40.0 TO 44.9 IN ADULT, UNSPECIFIED OBESITY TYPE (H): Primary | ICD-10-CM

## 2025-01-08 DIAGNOSIS — E66.813 CLASS 3 SEVERE OBESITY WITH SERIOUS COMORBIDITY AND BODY MASS INDEX (BMI) OF 40.0 TO 44.9 IN ADULT, UNSPECIFIED OBESITY TYPE (H): Primary | ICD-10-CM

## 2025-01-08 DIAGNOSIS — E66.812 OBESITY, CLASS II, BMI 35-39.9: ICD-10-CM

## 2025-01-08 PROCEDURE — G2211 COMPLEX E/M VISIT ADD ON: HCPCS

## 2025-01-08 PROCEDURE — 98005 SYNCH AUDIO-VIDEO EST LOW 20: CPT

## 2025-01-08 RX ORDER — TIRZEPATIDE 10 MG/.5ML
10 INJECTION, SOLUTION SUBCUTANEOUS
Qty: 2 ML | Refills: 3 | Status: SHIPPED | OUTPATIENT
Start: 2025-01-08

## 2025-01-08 ASSESSMENT — PAIN SCALES - GENERAL: PAINLEVEL_OUTOF10: NO PAIN (0)

## 2025-01-08 NOTE — PATIENT INSTRUCTIONS
"Thank you for allowing us the privilege of caring for you. We hope we provided you with the excellent service you deserve.   Please let us know if there is anything else we can do for you so that we can be sure you are completely satisfied with your care experience.    To ensure the quality of our services you may be receiving a patient satisfaction survey from an independent patient satisfaction monitoring company.    The greatest compliment you can give is a \"Likely to Recommend\"    Your visit was with Mary Alvarez PA-C today.    Instructions per today's visit:     Delonte Yoder, it was great to visit with you today.  Here is a review of our visit.  If our clinic scheduler is not able to reach you please call [phone] to schedule your next appointments.    Plan  Continue zepbound 10mg - can reach out via [employer] if wanting to increase before next appointment   Ensure you are hitting protein and hydration goals on day after injection to help with managing mood, contact clinic if seeing worsened mood with this medication   Goals we discussed today:   Keep up excellent changes   Labs ordered today: pth, cmp, vitamin d   Follow up with Brianne Baltazar 3/21/25   Follow up with Mary Alvarez in 6 months   Dietician appointment as needed   Keep up the excellent work!       Information about Video Visits with [facility]: video visit information  _________________________________________________________________________________________________________________________________________________________  If you are asked by your clinic team to have your blood pressure checked:  [facility] do offer several locations for blood pressure checks. Please follow the below link to schedule an appointment. Scheduling an appointment at the pharmacy for a blood pressure check is now preferred.    Appointment Plus " ([email or web address])  _________________________________________________________________________________________________________________________________________________________  Important contact and scheduling information:  Please call our contact center at [phone] to schedule your next appointments.  To find a lab location near you, please call [phone].  For any nursing questions or concerns call Caitlin Melendez LPN at [phone] or Brielle Phillips RN at [phone]  Please call during clinic hours Monday through Friday 8:00a - 4:00p if you have questions or you can contact us via [employer] at anytime and we will reply during clinic hours.    Lab results will be communicated through My Chart or letter (if My Chart not used). Please call the clinic if you have not received communication after 1 week or if you have any questions.?  Clinic Fax: [phone]    _Interested in working with a health ?  Health coaches work with you to improve your overall health and wellbeing.  They look at the whole person, and may involve discussion of different areas of life, including, but not limited to the four pillars of health (sleep, exercise, nutrition, and stress management). Discuss with your care team if you would like to start working a health .  Health Coaching-3 Pack: Schedule by calling [phone]    $99 for three health coaching visits    Visits may be done in person or via phone    Coaching is a partnership between the  and the client; Coaches do not prescribe or diagnose    Coaching helps inspire the client to reach his/her personal goals   _________________________________________________________________________________________________________________________________________________________  __________  [facility] Get Moving Program  Our team of physical therapists is trained to help you understand and take control of your condition. They will perform a thorough  evaluation to determine your ability for activity and develop a customized plan to fit your goals and physical ability.  Scheduling: Unsure if the Get Moving program is right for you? Discuss the program with your medical provider or diabetes educator. You can also call us at [phone] to ask questions or schedule an appointment.   [facility] Get Moving [facility]  ____________________________________________________________________________________________________________________________________________________________________________        Thank you,   [facility] Comprehensive Weight Management Team

## 2025-01-08 NOTE — LETTER
"2025       RE: Dat Yoder  [address] [facility] [address]     Dear Colleague,    Thank you for referring your patient, Dat Yoder, to the [facility]NAG[facility] [place] [facility]. Please see a copy of my visit note below.      Return Medical Weight Management Note     Dat Yoder  MRN:  [id number]  :  1980  SHELBY:  2025    Dear Orestes Barrera MD, MD,    I had the pleasure of seeing your patient Dat Yoder. He is a 44 year old male who I am continuing to see for treatment of obesity related to:        3/22/2024    11:39 AM   --   I have the following health issues associated with obesity Pre-Diabetes    High Blood Pressure   I have the following symptoms associated with obesity Knee Pain    Depression    Back Pain       Assessment & Plan  Problem List Items Addressed This Visit    None  Visit Diagnoses       Class 3 severe obesity with serious comorbidity and body mass index (BMI) of 40.0 to 44.9 in adult, unspecified obesity type (H)    -  Primary    Relevant Orders    Comprehensive metabolic panel    Vitamin D Deficiency    Parathyroid Hormone Intact           Plan  Continue zepbound 10mg - can reach out via [employer] if wanting to increase before next appointment   Ensure you are hitting protein and hydration goals on day after injection to help with managing mood, contact clinic if seeing worsened mood with this medication   Goals we discussed today:   Keep up excellent changes   Labs ordered today: pth, cmp, vitamin d   Follow up with Brianne Baltazar 3/21/25   Follow up with Mary Alvarez in 6 months   Dietician appointment as needed   Keep up the excellent work!       INTERVAL HISTORY:  New MWM with me 3/28/24   Overweight onset in childhood, has strong family history of obesity (both parents and all siblings). \"I've yo-yo'd my entire life.\"   Age 19 weighed approximately 210lbs, " "through college gained up to 315lbs. Did a weight loss challenge (hydroxycut and strict calorie intake) with friends in his 20s, lost down to 219 lbs, but then saw weight regain once stopping. Weight continued to yo yo between 290 and 250.   Did keto [id number], was able to lose 290 to 255, but saw weight regain despite sticking to keto diet. Current weight is 290 lbs.    Past strategies to lose weight keto, noom, [place] diet, hydroxycut, calorie counting, intermittent fasting, over the last several years, but has not seen successful weight loss from this.     Past obstacles in weight loss: strong family history of genetics, difficulty finding a sustainable diet plan, travels a lot for work.   Comorbidities associated with weight gain include sleep apnea (confirmed recently by sleep study, getting set up with CPAP), elevated BP without diagnosis of HTN, depression (sees therapist once a month).      Motivators for weight loss include improve mood, reduce risk for health problems, be a good example for kids (daughter is currently dealing with weight issues as well).   He is interested in starting a medication as a tool for working towards sustainable weight loss.     Regarding eating patterns and diet, he typically eats 2-3 meals a day. Has a hectic schedule with work, travels a lot, sometimes skips lunch. Craves sweets, snacks on sweets maybe once a week. Is able to get full. Can stay full until next meal. Does not struggle with portion control, though sometimes orders extra food for his work.   Loves food, gets excited by different and interesting flavors.  Does not experience food noise. Does experience emotional eating (stress, celebration) . Does not experience a loss of control around eating.  Does snack out of boredom, but doesn't feel he deals with boredom generally (very busy with work and family).   Does a lot of eating out or takeout with clients for work, hates having a \"special\" diet when eating " "socially, really disliked this about keto.   Eats out/ gets take out 4-5 times a week (mostly for lunch through work). Drinks water, 1-2 diet sodas a day. Does not drink juice or regular pop. Pre-workout in the morning, seldom coffee. 1-2 beers 4 times a week (bush lite). Sometimes a cocktail socially.     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 Is on his feet for some of his work, other times is at a desk all day. Works out 5-6 times a week for an hour in the morning before work. Used to love running outdoors, will do this in the summer.   -started wegovy   -switched to zepbound for improved weight loss through Pharm D Brianen Munroe   -increased to zepbound 10mg     Today in visit 1/8/25  On zepbound 10mg currently- feeling good on this     Wt Readings from Last 5 Encounters:   01/08/25 124.7 kg (275 lb)   10/31/24 123.4 kg (272 lb)   05/08/24 130.6 kg (288 lb)   03/28/24 131.5 kg (290 lb)   02/02/24 126.6 kg (279 lb)       Anti-obesity medication history    Current:   Zepbound 10mg- 2 weeks into current dose.  notes he's more muhammad for the first 24 hours after a dose, but feels this is overall tolerable. Denies SI related to this. Less GI discomfort with zepbound compared to wegovy- notices bms are more regular. Denies GERD symptoms. Denies nausea or vomiting.     Past/Failed/contraindicated:       Recent diet changes: Notes he sometimes has to remind himself to eat, which he feels to be positive. Describes a \"pleasant disinterest\" in food. Also reduced cravings in alcohol, which has helped him reduce alcohol consumption.   Some increased sugar consumption since the holidays.   Quick meal recall  B: protein - eggs or steak made with eggs. Drinks water. Pre-workout in the morning.   L: Might skip if busy with work. diet coke, kennedy or greek salad or  food.  D: Cooks 95% of the time, lots of stir dolan (protein + veggies+ olive oil+ sauce). Sometimes " has zoodles. Sometimes has cauliflower pizza.   Typically doesn't eat after dinner- sometimes might have a bit of frozen yogurt- but feels sweets are slippery slope for him.     Recent exercise/activity changes: in the middle of a 75 day streak of exercising- though otherwise averaging 5x a week with exercising in the morning.  incorporating lifting into his workout schedule which he's loving. Training for a 10 mile run in April.     Recent sleep changes: notes quality of sleep is better- started a cpap 5 months ago.     Vitamins/Labs: Pth, vitamin d, cmp ordered today. Currently taking a multivitamin daily, also will have a prework out drink that has vitamins in it.       CURRENT WEIGHT:   275 lbs 0 oz    Initial Weight (lbs): 290 lbs  Last Visits Weight: 131.5 kg (290 lb)  Cumulative weight loss (lbs): 15  Weight Loss Percentage: 5.17%        1/5/2025    12:57 PM   Changes and Difficulties   I have made the following changes to my diet since my last visit: Eating significantly less   With regards to my diet, I am still struggling with: Sugar   I have made the following changes to my activity/exercise since my last visit: Started lifting with a partner 3-5 times/week. Starting 75 day challenge working out everydat.             MEDICATIONS:   Current Outpatient Medications   Medication Sig Dispense Refill     Multiple Vitamins-Minerals (MULTIVITAMIN ADULT PO)        Semaglutide-Weight Management (WEGOVY) 2.4 MG/0.75ML pen Inject 2.4 mg subcutaneously once a week. after 4 weeks on 1.7 mg dose 3 mL 1     tirzepatide-Weight Management (ZEPBOUND) 7.5 MG/0.5ML prefilled pen Inject 0.5 mLs (7.5 mg) subcutaneously every 7 days. Take week after last Wegovy 2.4mg dose 2 mL 2     ZEPBOUND 10 MG/0.5ML prefilled pen Inject 0.5 mLs (10 mg) subcutaneously every 7 days. Start after 4 weeks of 7.5 mg Zepbound, if tolerating 2 mL 1           1/5/2025    12:57 PM   Weight Loss Medication History Reviewed With Patient   Are you having  any side effects from the weight loss medication that we have prescribed you? Yes   If you are having side effects please describe: Mood                No data to display                  PHYSICAL EXAM:  Objective   Ht 1.829 m (6')   Wt 124.7 kg (275 lb)   BMI 37.30 kg/m      Vitals - Patient Reported  Pain Score: No Pain (0)      Vitals:  No vitals were obtained today due to virtual visit.    GENERAL: alert and no distress  EYES: Eyes grossly normal to inspection.  No discharge or erythema, or obvious scleral/conjunctival abnormalities.  RESP: No audible wheeze, cough, or visible cyanosis.    SKIN: Visible skin clear. No significant rash, abnormal pigmentation or lesions.  NEURO: Cranial nerves grossly intact.  Mentation and speech appropriate for age.  PSYCH: Appropriate affect, tone, and pace of words        Sincerely,    Mary Alvarez PA-C      23 minutes spent by me on the date of the encounter doing chart review, history and exam, documentation and further activities per the note    The longitudinal plan of care for the diagnosis(es)/condition(s) as documented were addressed during this visit. Due to the added complexity in care, I will continue to support Dat in the subsequent management and with ongoing continuity of care.     Virtual Visit Details    Type of service:  Video Visit     Originating Location (pt. Location): Home    Distant Location (provider location):  Off-site  Platform used for Video Visit: [employer]Well      Again, thank you for allowing me to participate in the care of your patient.      Sincerely,    Mary Alvarez PA-C

## 2025-01-08 NOTE — NURSING NOTE
Current patient location: [address] [place] [id number]    Is the patient currently in the state of [place]? YES    Visit mode:VIDEO    If the visit is dropped, the patient can be reconnected by:VIDEO VISIT: Text to cell phone:   Telephone Information:   Mobile [phone]       Will anyone else be joining the visit? NO  (If patient encounters technical issues they should call [phone]    Are changes needed to the allergy or medication list? No    Are refills needed on medications prescribed by this physician? Discuss with provider  Pt wants to know what is approved regarding insurance for various doses.    Rooming Documentation:  Questionnaire(s) completed    Reason for visit: JENNIFER MCCORMACK

## 2025-01-08 NOTE — PROGRESS NOTES
Virtual Visit Details    Type of service:  Video Visit     Originating Location (pt. Location): Home    Distant Location (provider location):  Off-site  Platform used for Video Visit: [employer]

## 2025-01-27 ENCOUNTER — TELEPHONE (OUTPATIENT)
Dept: ENDOCRINOLOGY | Facility: CLINIC | Age: 45
End: 2025-01-27
Payer: COMMERCIAL

## 2025-01-27 NOTE — TELEPHONE ENCOUNTER
Patient confirmed scheduled appointment:  Date: 7/8/25  Time: 8 am  Visit type: ZULEMA SEQUEIRA  Provider: Mary Alvarez  Location: virtual  Testing/imaging: n/a  Additional notes: n/a

## 2025-02-03 ENCOUNTER — OFFICE VISIT (OUTPATIENT)
Dept: URGENT CARE | Facility: URGENT CARE | Age: 45
End: 2025-02-03
Payer: COMMERCIAL

## 2025-02-03 ENCOUNTER — ANCILLARY PROCEDURE (OUTPATIENT)
Dept: GENERAL RADIOLOGY | Facility: CLINIC | Age: 45
End: 2025-02-03
Attending: EMERGENCY MEDICINE
Payer: COMMERCIAL

## 2025-02-03 VITALS
TEMPERATURE: 100.2 F | DIASTOLIC BLOOD PRESSURE: 83 MMHG | HEART RATE: 100 BPM | WEIGHT: 286.7 LBS | SYSTOLIC BLOOD PRESSURE: 159 MMHG | BODY MASS INDEX: 38.88 KG/M2 | RESPIRATION RATE: 20 BRPM | OXYGEN SATURATION: 96 %

## 2025-02-03 DIAGNOSIS — J18.9 PNEUMONIA OF RIGHT LOWER LOBE DUE TO INFECTIOUS ORGANISM: Primary | ICD-10-CM

## 2025-02-03 DIAGNOSIS — J01.00 ACUTE MAXILLARY SINUSITIS, RECURRENCE NOT SPECIFIED: ICD-10-CM

## 2025-02-03 DIAGNOSIS — H65.192 OTHER NON-RECURRENT ACUTE NONSUPPURATIVE OTITIS MEDIA OF LEFT EAR: ICD-10-CM

## 2025-02-03 DIAGNOSIS — J40 BRONCHITIS WITH ACUTE WHEEZING: ICD-10-CM

## 2025-02-03 PROCEDURE — 71046 X-RAY EXAM CHEST 2 VIEWS: CPT | Mod: TC | Performed by: RADIOLOGY

## 2025-02-03 PROCEDURE — 99214 OFFICE O/P EST MOD 30 MIN: CPT | Performed by: EMERGENCY MEDICINE

## 2025-02-03 RX ORDER — ALBUTEROL SULFATE 90 UG/1
2 INHALANT RESPIRATORY (INHALATION) EVERY 6 HOURS PRN
Qty: 18 G | Refills: 0 | Status: SHIPPED | OUTPATIENT
Start: 2025-02-03

## 2025-02-03 RX ORDER — AZITHROMYCIN 250 MG/1
TABLET, FILM COATED ORAL
Qty: 6 TABLET | Refills: 0 | Status: SHIPPED | OUTPATIENT
Start: 2025-02-03 | End: 2025-02-08

## 2025-02-03 NOTE — PROGRESS NOTES
Assessment & Plan     Diagnosis:    ICD-10-CM    1. Pneumonia of right lower lobe due to infectious organism  J18.9 XR Chest 2 Views     amoxicillin-clavulanate (AUGMENTIN) 875-125 MG tablet     azithromycin (ZITHROMAX) 250 MG tablet     albuterol (PROAIR HFA/PROVENTIL HFA/VENTOLIN HFA) 108 (90 Base) MCG/ACT inhaler      2. Other non-recurrent acute nonsuppurative otitis media of left ear  H65.192       3. Acute maxillary sinusitis, recurrence not specified  J01.00           Medical Decision Making:  Dat Yoder is a 44 year old male who presents for evaluation of cough, congestion, ear pain and fever.  History, physical exam and imaging studies are consistent with pneumonia. Does have signs of OM on the left as well.  There are no signs of complications of pneumonia at this point such as septic shock, bacteremia, empyema, hypoxia, respiratory failure or compromise.  Supportive outpatient management is therefore indicated.   This seems to be community acquired pneumonia and there are no risk factors at this point for coverage of antibiotic-resistant strains. Patient should follow-up with primary care physician or return if not improving within 2-3 days maximum.  Signs for visit to ED were discussed with patient and pneumonia precautions given for home.        Min Barbosa PA-C  [facility]    Subjective     Dat Yoder is a 44 year old male who presents to clinic today for the following health issues:  Chief Complaint   Patient presents with    Cough     Mostly dry cough, some chest congestion, a little wheezing and rattling, sx started last Wednesday, lung part worsened Saturday night. Pt has taken Mucinex, Advil, Tylenol, ibuprofen, herbal tea. Pt tested Neg for Flu A, Band Covid on Wed and Thursday of last week. Pt has not taken any otc medication this morning.     Breathing Problem     SOB when walking or any activity since last Wednesday     Sinus Problem     Sinus pressure      Otalgia     Bilateral ear pain.        HPI  Patient states that 1 week ago he started having cough, congestion, worsened Wednesday.  States that he was feeling a bit better for a couple of days but over the last 24 hours he feels like he is getting worse again.  Short of breath with walking or even light exercise.  Is also having sinus pain and pressure, ear pain bilaterally.  No difficulties breathing at rest.  States that is not really coughing anything up now.  No chest pain, nausea, vomiting, diarrhea.  No lightheadedness.  No history of asthma or lung problems.    Review of Systems    See HPI    Objective      Vitals: BP (!) 159/83 (BP Location: Left arm, Patient Position: Sitting, Cuff Size: Adult Large)   Pulse 100   Temp 100.2  F (37.9  C) (Tympanic)   Resp 20   Wt 130 kg (286 lb 11.2 oz)   SpO2 96%   BMI 38.88 kg/m        Patient Vitals for the past 24 hrs:   BP Temp Temp src Pulse Resp SpO2 Weight   02/03/25 1227 (!) 159/83 100.2  F (37.9  C) Tympanic 100 20 96 % 130 kg (286 lb 11.2 oz)       Vital signs reviewed by: Min Barbosa PA-C    Physical Exam   Constitutional: Patient is alert and cooperative. No acute distress.  Ears: Right TM is normal. Left TM is erythematous and slightly bulging. No perforation. No mastoid tenderness. External ear canals are normal.  Mouth: Mucous membranes are moist. Normal tongue and tonsil. Posterior oropharynx is clear.  Eyes: Conjunctivae and lids are normal.  Cardiovascular: Regular rate and rhythm  Pulmonary/Chest: Crackles in the right lower lobe.  Remaining lung fields are clear.  Effort normal.  Speaking in full sentences, no respiratory distress.  Neurological: Alert and oriented x3.   Skin: No rash noted on visualized skin.  Psychiatric:The patient has a normal mood and affect.     Labs/Imaging:  Results for orders placed or performed in visit on 02/03/25   XR Chest 2 Views     Status: None (Preliminary result)    Narrative    EXAM: XR CHEST 2  VIEWS  LOCATION: [facility]  DATE: 02/03/2025    INDICATION: Bronchitis with acute wheezing.  COMPARISON: None.      Impression    IMPRESSION: Consolidation at the right lower lobe consistent with acute airspace disease such as lobar pneumonia. No effusion. Left lung appears clear. Normal cardiac silhouette.       Reading per radiology      Min Barbosa PA-C, February 3, 2025

## 2025-02-23 ENCOUNTER — HEALTH MAINTENANCE LETTER (OUTPATIENT)
Age: 45
End: 2025-02-23

## 2025-04-03 ENCOUNTER — VIRTUAL VISIT (OUTPATIENT)
Dept: PHARMACY | Facility: CLINIC | Age: 45
End: 2025-04-03
Payer: COMMERCIAL

## 2025-04-03 DIAGNOSIS — E66.812 OBESITY, CLASS II, BMI 35-39.9: Primary | ICD-10-CM

## 2025-04-03 NOTE — PROGRESS NOTES
Medication Therapy Management (MTM) Encounter    ASSESSMENT:                            Medication Adherence/Access: see below- will send in refills of 12.5mg Zepbound AND 15mg dose to help prevent lapse in treatment if quantity limit for 12.5 mg as patient previously experienced with Zepbound 7.5mg.    Weight management :   Patient would benefit from continuing pharmacotherapy for weight management. Given tolerating well, class II obesity, recommend optimizing GLP1/GIP therapy as data to support most significant weight loss and patient has no contraindications. Patient also realizing benefit from reduction in food noise and increased satiety - which has waned over time. Recommend dose increase Zepbound - see above for quantity limit considerations. Discussed dietary and behavioral modifications for medication safety and efficacy.       PLAN:                            Increase to Zepbound 12.5mg weekly - refills sent in the event that quantity limit no longer an issue OR to repeat if you have side effects and would benefit from remaining on lower dose.    IF tolerating Zepbound 12.5 mg, and required to increase dose as we suspect form your insurance - 30 day (1 month) supply of Zepbound 15 mg + 2 refills also sent to [facility] pharmacy for you to increase after 4 weeks on Zepbound 12.5mg if no side effects.    To help with tolerability and effectiveness of Zepbound :  Eat 3 meals with protein focus daily to help with nausea. If you forget to eat, you may feel nausea as a hunger cue.  Focus on getting protein in first with each meal and snack.   A good starting goal is 60 g protein daily (track this, especially if at weight loss plateau). Once you consistently are getting 60g daily, try getting 90 g protein daily.  See list below of protein-rich food ideas  Drink plenty of water - goal 64 oz throughout the day  You may try Metamucil, Benefiber, or Citrucel to help feel more full (less nausea) and have softer, more  "consistent bowel movements.  To optimize weight management - work on incorporating resistance training/weight lifting to build muscle and improve overall metabolism of adipose tissue.      Follow-up: this fall with Brianne Baltazar, PharmD - as decided with Mary Alvarez PA-C at visit in July.    [facility] Phone Number: [phone] (schedules for [facility] and [facility] - providers, dietitians, health coaches)    Appointments in Next Year      Jul 08, 2025 8:00 AM  (Arrive by 7:45 AM)  Return Weight Management Visit with Mary Alvarez PA-C  [facility] [place] ([facility] ) [phone]            SUBJECTIVE/OBJECTIVE:                          Dat Yoder is a 44 year old male seen for a follow-up visit.       Reason for visit: Medication Therapy Management - weight management .    Allergies/ADRs: Reviewed in chart  Past Medical History: Reviewed in chart  Tobacco: He reports that he quit smoking about 13 years ago. His smoking use included cigarettes. He has never used smokeless tobacco.  Alcohol: Socially - Weekends     Medication Adherence/Access: Medication barriers: obtaining medication from insurance.  Patient insurance requires only 30 day (1 month) of \"step increase doses\" so doses liek 7.5, 12.5 mg Zepbound have quantity limit per patient. If dose increase to 12.5 mg today (see below) may require next dose prescription as well.    Weight Management   Zepbound 10 mg weekly x 90 day (3 month)     Patient denies side effects - notes tolerates Zepbound better than Wegovy (occasional nausea with Wegovy - none with Zepbound). Significant reduction in food noise and recognizing hunger cues when dose increased initially - now these effects wearing off and feels ready for dose increase. Notes lots of travel with work lately and this has in the past been ok to make healthy food choices " even when traveling - but this has been more difficult and this informs his request for dose increase.      Nutrition/Eating Habits: goal is 1 gal water daily - this has decreased recently with kitchen remodel at work and more difficult to get cold drinking water -- this will be fixed soon and goal to get back to more water.     Works to eat whole foods, low carb. . Tries to not skip meals as he knows food is fuel.Loves food and wants to have a good relationship to food.    Exercise/Activity: goal to get back to gym several days per week with friend to lift weights.    No history of pancreatitis  No personal or family history of MTC   No personal or family history of Multiple Endocrine Neoplasia Type 2  .   History:  diet: ast strategies to lose weight keto, noom, [place] diet, hydroxycut, calorie counting, intermittent fasting, over the last several years, but has not seen successful weight loss from this.        Initial consult weight: 288 lb           Current weight today: notes that some weight increase with recent travel - does not offer current weight in lbs.  Cumulative Weight Loss: -2 lb, -0.7% from baseline (from last clinic visit in February - 14 lb increase from lowest weight since starting GLP1/GIP agonist for weight management)    Wt Readings from Last 4 Encounters:   02/03/25 286 lb 11.2 oz (130 kg)   01/08/25 275 lb (124.7 kg)   10/31/24 272 lb (123.4 kg)   05/08/24 288 lb (130.6 kg)     Estimated body mass index is 38.88 kg/m  as calculated from the following:    Height as of 1/8/25: 6' (1.829 m).    Weight as of 2/3/25: 286 lb 11.2 oz (130 kg).      Today's Vitals: There were no vitals taken for this visit.  ----------------      I spent 17 minutes with this patient today. All changes were made via collaborative practice agreement with Mary Alvarez     A summary of these recommendations was sent via [employer].    Brianne Baltazar, Pharm D., MPH    Medication Therapy Management Pharmacist   [facility]  [facility]     Telemedicine Visit Details  The patient's medications can be safely assessed via a telemedicine encounter.  Type of service:  Video Conference via [employer]Well  Originating Location (pt. Location): Other work     Distant Location (provider location):  Off-site  Start Time:  7:59 AM  End Time:  8:16 AM     Medication Therapy Recommendations  Obesity, Class II, BMI 35-39.9   1 Current Medication: ZEPBOUND 10 MG/0.5ML prefilled pen (Discontinued)   Current Medication Sig: Inject 0.5 mLs (10 mg) subcutaneously every 7 days.   Rationale: Dose too low - Dosage too low - Effectiveness   Recommendation: Increase Dose   Status: Accepted per CPA   Identified Date: 4/3/2025 Completed Date: 4/3/2025

## 2025-04-03 NOTE — PATIENT INSTRUCTIONS
Recommendations from MTM Pharmacist visit:                                                    MTM (medication therapy management) is a service provided by a clinical pharmacist designed to help you get the most of out of your medicines.  You may be sent a phone or email survey evaluating today's visit.  Please provide feedback you have for the service he received today if you are able.      Increase to Zepbound 12.5mg weekly - refills sent in the event that quantity limit no longer an issue OR to repeat if you have side effects and would benefit from remaining on lower dose.    IF tolerating Zepbound 12.5 mg, and required to increase dose as we suspect form your insurance - 30 day (1 month) supply of Zepbound 15 mg + 2 refills also sent to [facility] pharmacy for you to increase after 4 weeks on Zepbound 12.5mg if no side effects.    To help with tolerability and effectiveness of Zepbound :  Eat 3 meals with protein focus daily to help with nausea. If you forget to eat, you may feel nausea as a hunger cue.  Focus on getting protein in first with each meal and snack.   A good starting goal is 60 g protein daily (track this, especially if at weight loss plateau). Once you consistently are getting 60g daily, try getting 90 g protein daily.  See list below of protein-rich food ideas  Drink plenty of water - goal 64 oz throughout the day  You may try Metamucil, Benefiber, or Citrucel to help feel more full (less nausea) and have softer, more consistent bowel movements.  To optimize weight management - work on incorporating resistance training/weight lifting to build muscle and improve overall metabolism of adipose tissue.      Follow-up: this fall with Rik RojoD - as decided with Mary Alvarez PA-C at visit in July.    [facility] Phone Number: [phone] (schedules for [facility] and [facility] - providers, dietitians, health coaches)    Appointments in Next Year   "    Jul 08, 2025 8:00 AM  (Arrive by 7:45 AM)  Return Weight Management Visit with Mary Alvarez PA-C  [facility] [place] ([facility] ) [phone]                It was great speaking with you today.  I value your experience and would be very thankful for your time in providing feedback in our clinic survey. In the next few days, you may receive an email or text message from [employer]" with a link to a survey related to your  clinical pharmacist.\"     To schedule another MTM appointment, please call the clinic directly ([facility] Phone Number: [phone] (schedules for [facility] and [facility] - providers, dietitians, health coaches) or you may call the MTM scheduling line at [phone] or toll-free at [phone].     My Clinical Pharmacist's contact information:                                                      Please feel free to contact me with any questions or concerns you have.      Brianne Baltazar, Pharm D., MPH    Medication Therapy Management Pharmacist   [facility]          Meal Replacement Shake Options:   *Protein Shake Criteria: no more than 210 Calories, at least 20 grams of protein, and less than 10 grams of sugar   Premier Protein (160 Calories, 30 g protein)  Slim Fast Advanced Nutrition (180 Calories, 20 g protein)  Muscle Milk, lactose-free, 17 oz bottle (210 Calories, 30 g protein)  Integrated Supplements, no artificial sugars (110 Calories, 20 g protein)  Boost/Ensure Max (160 calories, 30 gm protein)   Fairlife Protein Shakes (160-230 calories, 26-42 gm protein)  Aldi's Elevation Protein Powder (180 calories, 30 gm protein)   Orgain Protein Shakes (130-160 calories, 20-26 gm protein)     Meal Replacement Bar Options:  Quest Protein Bars (190 Calories, 20 g protein)  Built Bar (170 Calories, 15-20 g protein)  One Protein " Bar (210 calories, 20 g protein)  [place] Signature Protein Bar ([employer]) (190 Calories, 21 g protein)  Pure Protein Bars (180 Calories, 21 g protein)    Low Calorie Frozen Meal:  Healthy Choice Power Bowls  Franck Louieabida      ---------------------------------------------------------------  Tips to Increase the Protein in Your Diet  You may need more protein in your diet to help you heal from an illness, surgery or wound. Extra protein can also help you gain weight. Here are some ideas for adding high-protein foods to your meals.  Meat and fish  Add chopped cooked meat to vegetables, salads, casseroles, soups, sauces and biscuit dough.  Use in omelets, soufflés, quiches, sandwich fillings and chicken or turkey stuffing.  Wrap in pie crust or biscuit dough to make a turnover.  Add to stuffed baked potatoes.  Make a dip with diced meat or flaked fish mixed with sour cream and spices.  Chopped, hard-cooked eggs  Add to salads.  Use for snacks and sandwich filling.  High-protein milk  To make high-protein milk, mix 1 quart whole milk with 1 cup powdered milk.  Add to cream soups, mashed potatoes, scrambled eggs, cereals and dried eggnog mix.  Use as an ingredient in puddings, custards, hot chocolate, milk shakes and pancakes.  Powdered milk  If you don't have any high-protein milk on hand, you can use powdered milk. Add 3 tablespoons to:  gravies, sauces, cream soups, mayonnaise  casseroles, meat patties, meatloaf, tuna salad, deviled ham  scalloped or mashed potatoes, creamed spinach  scrambled eggs, egg salad  cereals  yogurt, milk drinks, ice cream, frozen desserts, puddings, custards.  Add 4 to 6 tablespoons powdered milk to make:  cream sauces  breads, muffins, pancakes, waffles, cookies, cakes  cream pies, frostings, cake fillings  fruit cobblers, bread or rice pudding, gelatin desserts.  For high-protein eggnog, add 3 to 6 tablespoons powdered milk to prepared eggnog.  Hard or soft  cheese  Melt on sandwiches, breads, tortillas, hamburgers, hot dogs, other meats, vegetables, eggs and pies.  Grate into soups, chili, sauces, casseroles, vegetables, potatoes, rice, noodles or meatloaf.  Eat with toast or crackers, or melt for ashley dip.  Cottage cheese or ricotta cheese  Mix with or scoop on top of fruits and vegetables.  Add to casseroles, lasagna, spaghetti, noodles and egg dishes (omelets, scrambled eggs, soufflés).  Use in gelatin, pudding-type desserts, cheesecake and pancake batter.  Use to stuff crepes, pasta shells or manicotti.  Fruit yogurt  Blend with fruits for a fruit smoothie.  Use as a dip for fruits and vegetables.  Scoop on top of pancakes or waffles.  Tofu  Blend silken tofu with fruits and juices for a smoothie.  Add chunks of firm tofu to soups and stews, or crumble into meatloaf.  Blend dried onion soup mix into soft or silken tofu for dip.  Use pureed silken tofu for part of the mayonnaise, sour cream, cream cheese or ricotta cheese called for in recipes.  Beans  Use cooked beans or peas in soups, casseroles, pasta, tacos and burritos.  Nuts and seeds  Note: For children under 3, discuss with the child's care team.  Use in casseroles, breads, muffins, pancakes, cookies and waffles.  Sprinkle on fruits, cereals, ice cream, yogurt, vegetables and salads.  Mix with raisins, dried fruits and chocolate chips for a snack.  Nut butters  Note: For children under 3, discuss with the child's care team.  Spread on sandwiches, toast, muffins, crackers, waffles, pancakes and fruit slices.  Use as a dip for raw vegetables.  Blend with milk drinks, or swirl through ice cream, yogurt or hot cereal.  Nutrition supplements (nutrition bars, drinks and powders)  Add powders to milk drinks and desserts.  Mix with ice cream, milk and fruit for a high-protein milk shake.    For informational purposes only. Not to replace the advice of your health care provider. Clinically reviewed by Clarissa  "Jean Tello, DANYELL, EMMANUELLE, and the Clinical Nutrition Service Line. Copyright   2005 [facility]. All rights reserved. [employer] 595821 - REV 04/24.      -----------------------------------------------------------------------------------------------------------------  Learning About High-Protein Foods  What foods are high in protein?     The foods you eat contain nutrients, such as vitamins and minerals. Protein is a nutrient. Your body needs the right amount to stay healthy and work as it should. You can use the list below to help you make choices about which foods to eat.  Here are some examples of foods that are high in protein.  Dairy and dairy alternatives  Cheese  Milk  Soy milk  Yogurt (especially Greek)  Meat  Beef  Chicken  Ham  Lamb  Lunch meat  Pork  Sausage  Turkey  Other protein foods  Hemp seeds!  Beans (black, garbanzo, kidney, lima)  Eggs  Hummus  Lentils  Nuts  Peanut butter and other nut butters  Peas  Soybeans  Tofu  Veggie or soy leyda (Check the nutrition label for the amount of protein in each serving.)  Seafood  Anchovies  Cod  Crab  Halibut  [place]  Sardines  Shrimp  Tilapia  [place]  Tuna  Protein supplements  Bars (Check the nutrition label for the amount of protein in each serving.)  Drinks  Powders  Work with your doctor to find out how much of this nutrient you need. Depending on your health, you may need more or less of it in your diet.  Where can you learn more?  Go to https://www.[employer].net/patiented  Enter P335 in the search box to learn more about \"Learning About High-Protein Foods.\"  Current as of: September 20, 2023               Content Version: 14.0    3828-1320 [employer].   Care instructions adapted under license by your healthcare professional. If you have questions about a medical condition or this instruction, always ask your healthcare professional. [employer] disclaims any warranty or liability for your use of this " information.

## 2025-04-03 NOTE — NURSING NOTE
Current patient location:  [place]    Is the patient currently in the state of [place]? YES    Visit mode: VIDEO    If the visit is dropped, the patient can be reconnected by:VIDEO VISIT: Text to cell phone:   Telephone Information:   Mobile [phone]       Will anyone else be joining the visit? NO  (If patient encounters technical issues they should call [phone])    Are changes needed to the allergy or medication list? No    Are refills needed on medications prescribed by this physician? NO    Rooming Documentation:  Questionnaire(s) completed    Reason for visit: RECHECK    Monserrat MCCORMACK

## 2025-04-03 NOTE — Clinical Note
Increased Zepbound today as seeing some returning food noise, less satiety, weight gain.  Follow up [facility] in July. No follow up with Medication Therapy Management yet - will schedule based on MONO recs at that time.  Brianne

## 2025-06-12 ENCOUNTER — TELEPHONE (OUTPATIENT)
Dept: ENDOCRINOLOGY | Facility: CLINIC | Age: 45
End: 2025-06-12
Payer: COMMERCIAL

## 2025-06-12 NOTE — TELEPHONE ENCOUNTER
Prior Authorization Approval    Medication: ZEPBOUND 12.5 MG/0.5ML SC SOAJ  Authorization Effective Date: 5/13/2025  Authorization Expiration Date: 2/7/2026  Approved Dose/Quantity: uud  Reference #: Key: [id number]   Insurance Company: [employer] Non-Specialty PA's - Phone [phone] Fax [phone]  Expected CoPay: $    CoPay Card Available:      Financial Assistance Needed:     Key: [id number]

## 2025-07-07 NOTE — PROGRESS NOTES
"  Return Medical Weight Management Note     Dat Yoder  MRN:  [id number]  :  1980  SHELBY:  2025    Dear Orestes Barrera MD, MD,    I had the pleasure of seeing your patient Dat Yoder. He is a 44 year old male who I am continuing to see for treatment of obesity related to:        3/22/2024    11:39 AM   --   I have the following health issues associated with obesity Pre-Diabetes    High Blood Pressure   I have the following symptoms associated with obesity Knee Pain    Depression    Back Pain       Assessment & Plan   Problem List Items Addressed This Visit       Obesity, Class II, BMI 35-39.9    Relevant Medications    tirzepatide-Weight Management (ZEPBOUND) 15 MG/0.5ML prefilled pen      Plan  Continue zepbound 15mg   Goals we discussed today:   Keep up excellent exercise   Increase protein- goal of 30g three times a day   Speak with sleep medicine provider to see if any adjustments can be made to help with keeping cpap on whole night   Labs ordered at previous visit   Follow up with Mary in 3 months   Dietician appointment to be scheduled asap- will be new RD visit   Keep up the excellent work!       INTERVAL HISTORY:  New MWM with me 3/28/24   Overweight onset in childhood, has strong family history of obesity (both parents and all siblings). \"I've yo-yo'd my entire life.\"   Age 19 weighed approximately 210lbs, through college gained up to 315lbs. Did a weight loss challenge (hydroxycut and strict calorie intake) with friends in his 20s, lost down to 219 lbs, but then saw weight regain once stopping. Weight continued to yo yo between 290 and 250.   Did keto 4240-3718, was able to lose 290 to 255, but saw weight regain despite sticking to keto diet. Current weight is 290 lbs.    Past strategies to lose weight keto, noom, [place] diet, hydroxycut, calorie counting, intermittent fasting, over the last several years, but has not seen successful weight loss from this.      Past " "obstacles in weight loss: strong family history of genetics, difficulty finding a sustainable diet plan, travels a lot for work.   Comorbidities associated with weight gain include sleep apnea (confirmed recently by sleep study, getting set up with CPAP), elevated BP without diagnosis of HTN, depression (sees therapist once a month).      Motivators for weight loss include improve mood, reduce risk for health problems, be a good example for kids (daughter is currently dealing with weight issues as well).   He is interested in starting a medication as a tool for working towards sustainable weight loss.     Regarding eating patterns and diet, he typically eats 2-3 meals a day. Has a hectic schedule with work, travels a lot, sometimes skips lunch. Craves sweets, snacks on sweets maybe once a week. Is able to get full. Can stay full until next meal. Does not struggle with portion control, though sometimes orders extra food for his work.   Loves food, gets excited by different and interesting flavors.  Does not experience food noise. Does experience emotional eating (stress, celebration) . Does not experience a loss of control around eating.  Does snack out of boredom, but doesn't feel he deals with boredom generally (very busy with work and family).   Does a lot of eating out or takeout with clients for work, hates having a \"special\" diet when eating socially, really disliked this about keto.   Eats out/ gets take out 4-5 times a week (mostly for lunch through work). Drinks water, 1-2 diet sodas a day. Does not drink juice or regular pop. Pre-workout in the morning, seldom coffee. 1-2 beers 4 times a week (bush lite). Sometimes a cocktail socially.      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 Is on his feet for some of his work, other times is at a desk all day. Works out 5-6 times a week for an hour in the morning before work. Used to love running " outdoors, will do this in the summer.   -started wegovy   -switched to zepbound for improved weight loss through Pharm D Brianne Munroe   -increased to zepbound 10mg     Last seen by me 1/8/25  On zepbound 10mg currently- feeling good on this     Increased to 12.5mg with MTM pharmacist Brianne Baltazar     Today in visit 7/8/25  5lbs weight gain since last visit- frustrated with weight plateau.    Has increased zepbound to 15mg, seeing decrease in appetite but has not seen successful weight loss.   Goal to lose weight by December- officiating a wedding in the [place].     Discussed areas of Dat's life that might be affecting weight loss- some concern for insufficient protein especially earlier in the day. Will have Dat meet with a dietician to discuss further adjustments that can be made for more weight loss.       Wt Readings from Last 5 Encounters:   07/08/25 127 kg (280 lb)   02/03/25 130 kg (286 lb 11.2 oz)   01/08/25 124.7 kg (275 lb)   10/31/24 123.4 kg (272 lb)   05/08/24 130.6 kg (288 lb)       Anti-obesity medication history    Current:   Zepbound 15mg- some moodiness, occasional nausea. Reduction in food noise.     Past/Failed/contraindicated:       Recent diet changes:   Meal recall:  B- typically skipping. Has a pre-workout drink in the morning- largely caffeine.   L: kennedy without noodles   D: cooking dinner at home- salads 3-4 days a week. Grilling zoodles, cauliflower, mac n cheese. Going out to eat a few times a week.  Prioritizing fruits, vegetables, whole foods.   Drinking water throughout the day- la croix as well. Diet coke at lunch.     Recent exercise/activity changes: weight lifting 2-3 times a week, cardio the rest of the time. Working out 5 days a week.     Recent sleep changes: feels sleep is a struggle, some issues with staying asleep. Taking magnesium before bed. Using his cpap regularly, but will take it off in his sleep. Some issues as well with falling asleep- Hasn't liked melatonin in the past- felt  residual drowsiness in the morning.     Vitamins/Labs: pth, vitamin d, cmp ordered at previous visit       CURRENT WEIGHT:   280 lbs 0 oz    Initial Weight (lbs): 290 lbs  Last Visits Weight: 124.7 kg (275 lb)  Cumulative weight loss (lbs): 10  Weight Loss Percentage: 3.45%        1/5/2025    12:57 PM   Changes and Difficulties   I have made the following changes to my diet since my last visit: Eating significantly less   With regards to my diet, I am still struggling with: Sugar   I have made the following changes to my activity/exercise since my last visit: Started lifting with a partner 3-5 times/week. Starting 75 day challenge working out everydat.             MEDICATIONS:   Current Outpatient Medications   Medication Sig Dispense Refill    tirzepatide-Weight Management (ZEPBOUND) 15 MG/0.5ML prefilled pen Inject 0.5 mLs (15 mg) subcutaneously every 7 days. Ok to increase after 4 weeks on 12.5mg if tolerating 2 mL 3    albuterol (PROAIR HFA/PROVENTIL HFA/VENTOLIN HFA) 108 (90 Base) MCG/ACT inhaler Inhale 2 puffs into the lungs every 6 hours as needed for shortness of breath, wheezing or cough. 18 g 0    Multiple Vitamins-Minerals (MULTIVITAMIN ADULT PO)       tirzepatide-Weight Management (ZEPBOUND) 12.5 MG/0.5ML prefilled pen Inject 0.5 mLs (12.5 mg) subcutaneously every 7 days. 2 mL 2           1/5/2025    12:57 PM   Weight Loss Medication History Reviewed With Patient   Are you having any side effects from the weight loss medication that we have prescribed you? Yes   If you are having side effects please describe: Mood                No data to display                  PHYSICAL EXAM:  Objective    Ht 1.829 m (6')   Wt 127 kg (280 lb)   BMI 37.97 kg/m      Vitals - Patient Reported  Pain Score: No Pain (0)      Vitals:  No vitals were obtained today due to virtual visit.    GENERAL: alert and no distress  EYES: Eyes grossly normal to inspection.  No discharge or erythema, or obvious scleral/conjunctival  abnormalities.  RESP: No audible wheeze, cough, or visible cyanosis.    SKIN: Visible skin clear. No significant rash, abnormal pigmentation or lesions.  NEURO: Cranial nerves grossly intact.  Mentation and speech appropriate for age.  PSYCH: Appropriate affect, tone, and pace of words        Sincerely,    Mary Alvarez PA-C      23 minutes spent by me on the date of the encounter doing chart review, history and exam, documentation and further activities per the note    The longitudinal plan of care for the diagnosis(es)/condition(s) as documented were addressed during this visit. Due to the added complexity in care, I will continue to support Dat in the subsequent management and with ongoing continuity of care.

## 2025-07-08 ENCOUNTER — VIRTUAL VISIT (OUTPATIENT)
Dept: ENDOCRINOLOGY | Facility: CLINIC | Age: 45
End: 2025-07-08
Payer: COMMERCIAL

## 2025-07-08 VITALS — BODY MASS INDEX: 37.93 KG/M2 | WEIGHT: 280 LBS | HEIGHT: 72 IN

## 2025-07-08 DIAGNOSIS — E66.812 OBESITY, CLASS II, BMI 35-39.9: ICD-10-CM

## 2025-07-08 PROCEDURE — 98005 SYNCH AUDIO-VIDEO EST LOW 20: CPT

## 2025-07-08 PROCEDURE — G2211 COMPLEX E/M VISIT ADD ON: HCPCS | Mod: 95

## 2025-07-08 PROCEDURE — 1126F AMNT PAIN NOTED NONE PRSNT: CPT | Mod: 95

## 2025-07-08 ASSESSMENT — PAIN SCALES - GENERAL: PAINLEVEL_OUTOF10: NO PAIN (0)

## 2025-07-08 NOTE — PATIENT INSTRUCTIONS
"Thank you for allowing us the privilege of caring for you. We hope we provided you with the excellent service you deserve.   Please let us know if there is anything else we can do for you so that we can be sure you are completely satisfied with your care experience.    To ensure the quality of our services you may be receiving a patient satisfaction survey from an independent patient satisfaction monitoring company.    The greatest compliment you can give is a \"Likely to Recommend\"    Your visit was with Mary Alvarez PA-C today.    Instructions per today's visit:     Delonte Yoder, it was great to visit with you today.  Here is a review of our visit.  If our clinic scheduler is not able to reach you please call [phone] to schedule your next appointments.    Plan  Continue zepbound 15mg   Goals we discussed today:   Keep up excellent exercise   Increase protein- goal of 30g three times a day   Speak with sleep medicine provider to see if any adjustments can be made to help with keeping cpap on whole night   Labs ordered at previous visit   Follow up with Mary in 3 months   Dietician appointment to be scheduled asap   Keep up the excellent work!       Information about Video Visits with [facility]: video visit information  _________________________________________________________________________________________________________________________________________________________  If you are asked by your clinic team to have your blood pressure checked:  [facility] do offer several locations for blood pressure checks. Please follow the below link to schedule an appointment. Scheduling an appointment at the pharmacy for a blood pressure check is now preferred.    Appointment Plus (appointment-plus.UsingMiles)  _________________________________________________________________________________________________________________________________________________________  Important contact and scheduling " information:  Please call our contact center at [phone] to schedule your next appointments.  To find a lab location near you, please call [phone].  For any nursing questions or concerns call Caitlin Melendez LPN at [phone] or Brielle Phillips RN at [phone]  Please call during clinic hours Monday through Friday 8:00a - 4:00p if you have questions or you can contact us via [facility] at anytime and we will reply during clinic hours.    Lab results will be communicated through My Chart or letter (if My Chart not used). Please call the clinic if you have not received communication after 1 week or if you have any questions.?  Clinic Fax: [phone]    Work with [facility] !  Virtual Sessions are Available through [facility] Weight Management Clinics    To learn more, call to schedule an appointment: [phone]     What is Health Coaching?  Do you know what you are supposed to do, but you just aren't doing it?  Then, HEALTH COACHING may help you!   Get unstuck and move forward with the support of a professionally trained NBC-HWC (National Board-Certified Health and ) who uses evidence-based approaches to help you move forward with healthy lifestyle changes in the areas of weight loss, stress management and overall well-being.    Health Coaches help you identify goals that will work best for you. Health Coaches provide support and encouragement with overcoming barriers and help you to find inspiration and motivation to lead a healthy lifestyle.    Health Coaching 3-Pack; Three, 30-minute Health Coaching Visits, for $135  Health Coaching 6-Pack; Six, 30-minute Health coaching visits, for $250  Visits are done virtually (phone or video)  This is a self pay service; we do not accept insurance for nba coaching.    ____________________________________________________________________  [facility]  Healthy Lifestyle Group    Healthy Lifestyle  Group  This is a 60 minute virtual coaching group for those who want to lead a healthier lifestyle. Come together to set goals and overcome barriers in a supportive group environment. We will address the four pillars of health--nutrition, exercise, sleep and emotional well-being.  This group is highly recommended for those who are participating in the 24 week Healthy Lifestyle Plan and our Health Coaching sessions.    WHEN: This group meets the first Friday of the month, 12:30 PM - 1:30 PM online, via a zoom meeting.      FACILITATOR: Led by National Board Certified Health and , Annie Luna, [facility].    TO REGISTER: Please call the Call Center at [phone] to register. You will get an appointment to attend in Branch[facility]. Fifteen minutes prior to the meeting, complete the e-check in and you will get the link to join the meeting.  There is no charge to attend this group and space is limited.      _________________________________________________________________________________________________________________________________________________________  __________  [facility] Get Moving Program  Our team of physical therapists is trained to help you understand and take control of your condition. They will perform a thorough evaluation to determine your ability for activity and develop a customized plan to fit your goals and physical ability.  Scheduling: Unsure if the Get Moving program is right for you? Discuss the program with your medical provider or diabetes educator. You can also call us at [phone] to ask questions or schedule an appointment.   [facility] Get Moving Program  ____________________________________________________________________________________________________________________________________________________________________________        Thank you,    [facility] Weight Management Team

## 2025-07-08 NOTE — LETTER
"2025       RE: Dat Yoder  [address] [address]     Dear Colleague,    Thank you for referring your patient, Dat Yoder, to the [facility]NAG[facility] [place] [facility]. Please see a copy of my visit note below.      Return Medical Weight Management Note     Dat Yoder  MRN:  [id number]  :  1980  SHELBY:  2025    Dear Orestes Barrera MD, MD,    I had the pleasure of seeing your patient Dat Yoder. He is a 44 year old male who I am continuing to see for treatment of obesity related to:        3/22/2024    11:39 AM   --   I have the following health issues associated with obesity Pre-Diabetes    High Blood Pressure   I have the following symptoms associated with obesity Knee Pain    Depression    Back Pain       Assessment & Plan  Problem List Items Addressed This Visit       Obesity, Class II, BMI 35-39.9    Relevant Medications    tirzepatide-Weight Management (ZEPBOUND) 15 MG/0.5ML prefilled pen      Plan  Continue zepbound 15mg   Goals we discussed today:   Keep up excellent exercise   Increase protein- goal of 30g three times a day   Speak with sleep medicine provider to see if any adjustments can be made to help with keeping cpap on whole night   Labs ordered at previous visit   Follow up with Mary in 3 months   Dietician appointment to be scheduled asap- will be new RD visit   Keep up the excellent work!       INTERVAL HISTORY:  New MWM with me 3/28/24   Overweight onset in childhood, has strong family history of obesity (both parents and all siblings). \"I've yo-yo'd my entire life.\"   Age 19 weighed approximately 210lbs, through college gained up to 315lbs. Did a weight loss challenge (hydroxycut and strict calorie intake) with friends in his 20s, lost down to 219 lbs, but then saw weight regain once stopping. Weight continued to yo yo between 290 and 250.   Did keto " "[id number], was able to lose 290 to 255, but saw weight regain despite sticking to keto diet. Current weight is 290 lbs.    Past strategies to lose weight keto, noom, [place] diet, hydroxycut, calorie counting, intermittent fasting, over the last several years, but has not seen successful weight loss from this.      Past obstacles in weight loss: strong family history of genetics, difficulty finding a sustainable diet plan, travels a lot for work.   Comorbidities associated with weight gain include sleep apnea (confirmed recently by sleep study, getting set up with CPAP), elevated BP without diagnosis of HTN, depression (sees therapist once a month).      Motivators for weight loss include improve mood, reduce risk for health problems, be a good example for kids (daughter is currently dealing with weight issues as well).   He is interested in starting a medication as a tool for working towards sustainable weight loss.     Regarding eating patterns and diet, he typically eats 2-3 meals a day. Has a hectic schedule with work, travels a lot, sometimes skips lunch. Craves sweets, snacks on sweets maybe once a week. Is able to get full. Can stay full until next meal. Does not struggle with portion control, though sometimes orders extra food for his work.   Loves food, gets excited by different and interesting flavors.  Does not experience food noise. Does experience emotional eating (stress, celebration) . Does not experience a loss of control around eating.  Does snack out of boredom, but doesn't feel he deals with boredom generally (very busy with work and family).   Does a lot of eating out or takeout with clients for work, hates having a \"special\" diet when eating socially, really disliked this about keto.   Eats out/ gets take out 4-5 times a week (mostly for lunch through work). Drinks water, 1-2 diet sodas a day. Does not drink juice or regular pop. Pre-workout in the morning, seldom coffee. 1-2 beers 4 times a " week (bush lite). Sometimes a cocktail socially.      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 Is on his feet for some of his work, other times is at a desk all day. Works out 5-6 times a week for an hour in the morning before work. Used to love running outdoors, will do this in the summer.   -started wegovy   -switched to zepbound for improved weight loss through Pharm D Brianne Munroe   -increased to zepbound 10mg     Last seen by me 1/8/25  On zepbound 10mg currently- feeling good on this     Increased to 12.5mg with MTM pharmacist Brianne Baltazar     Today in visit 7/8/25  5lbs weight gain since last visit- frustrated with weight plateau.    Has increased zepbound to 15mg, seeing decrease in appetite but has not seen successful weight loss.   Goal to lose weight by December- officiating a wedding in the [place].     Discussed areas of Dat's life that might be affecting weight loss- some concern for insufficient protein especially earlier in the day. Will have Dat meet with a dietician to discuss further adjustments that can be made for more weight loss.       Wt Readings from Last 5 Encounters:   07/08/25 127 kg (280 lb)   02/03/25 130 kg (286 lb 11.2 oz)   01/08/25 124.7 kg (275 lb)   10/31/24 123.4 kg (272 lb)   05/08/24 130.6 kg (288 lb)       Anti-obesity medication history    Current:   Zepbound 15mg- some moodiness, occasional nausea. Reduction in food noise.     Past/Failed/contraindicated:       Recent diet changes:   Meal recall:  B- typically skipping. Has a pre-workout drink in the morning- largely caffeine.   L: kennedy without noodles   D: cooking dinner at home- salads 3-4 days a week. Grilling zoodles, cauliflower, mac n cheese. Going out to eat a few times a week.  Prioritizing fruits, vegetables, whole foods.   Drinking water throughout the day- la croix as well. Diet coke at lunch.     Recent exercise/activity changes: weight lifting 2-3 times a  week, cardio the rest of the time. Working out 5 days a week.     Recent sleep changes: feels sleep is a struggle, some issues with staying asleep. Taking magnesium before bed. Using his cpap regularly, but will take it off in his sleep. Some issues as well with falling asleep- Hasn't liked melatonin in the past- felt residual drowsiness in the morning.     Vitamins/Labs: pth, vitamin d, cmp ordered at previous visit       CURRENT WEIGHT:   280 lbs 0 oz    Initial Weight (lbs): 290 lbs  Last Visits Weight: 124.7 kg (275 lb)  Cumulative weight loss (lbs): 10  Weight Loss Percentage: 3.45%        1/5/2025    12:57 PM   Changes and Difficulties   I have made the following changes to my diet since my last visit: Eating significantly less   With regards to my diet, I am still struggling with: Sugar   I have made the following changes to my activity/exercise since my last visit: Started lifting with a partner 3-5 times/week. Starting 75 day challenge working out everydat.             MEDICATIONS:   Current Outpatient Medications   Medication Sig Dispense Refill     tirzepatide-Weight Management (ZEPBOUND) 15 MG/0.5ML prefilled pen Inject 0.5 mLs (15 mg) subcutaneously every 7 days. Ok to increase after 4 weeks on 12.5mg if tolerating 2 mL 3     albuterol (PROAIR HFA/PROVENTIL HFA/VENTOLIN HFA) 108 (90 Base) MCG/ACT inhaler Inhale 2 puffs into the lungs every 6 hours as needed for shortness of breath, wheezing or cough. 18 g 0     Multiple Vitamins-Minerals (MULTIVITAMIN ADULT PO)        tirzepatide-Weight Management (ZEPBOUND) 12.5 MG/0.5ML prefilled pen Inject 0.5 mLs (12.5 mg) subcutaneously every 7 days. 2 mL 2           1/5/2025    12:57 PM   Weight Loss Medication History Reviewed With Patient   Are you having any side effects from the weight loss medication that we have prescribed you? Yes   If you are having side effects please describe: Mood                No data to display                  PHYSICAL  EXAM:  Objective   Ht 1.829 m (6')   Wt 127 kg (280 lb)   BMI 37.97 kg/m      Vitals - Patient Reported  Pain Score: No Pain (0)      Vitals:  No vitals were obtained today due to virtual visit.    GENERAL: alert and no distress  EYES: Eyes grossly normal to inspection.  No discharge or erythema, or obvious scleral/conjunctival abnormalities.  RESP: No audible wheeze, cough, or visible cyanosis.    SKIN: Visible skin clear. No significant rash, abnormal pigmentation or lesions.  NEURO: Cranial nerves grossly intact.  Mentation and speech appropriate for age.  PSYCH: Appropriate affect, tone, and pace of words        Sincerely,    Mary Alvarez PA-C      23 minutes spent by me on the date of the encounter doing chart review, history and exam, documentation and further activities per the note    The longitudinal plan of care for the diagnosis(es)/condition(s) as documented were addressed during this visit. Due to the added complexity in care, I will continue to support Dat in the subsequent management and with ongoing continuity of care.     Again, thank you for allowing me to participate in the care of your patient.      Sincerely,    Mary Alvarez PA-C